# Patient Record
Sex: FEMALE | Race: ASIAN | NOT HISPANIC OR LATINO | URBAN - METROPOLITAN AREA
[De-identification: names, ages, dates, MRNs, and addresses within clinical notes are randomized per-mention and may not be internally consistent; named-entity substitution may affect disease eponyms.]

---

## 2024-01-21 ENCOUNTER — INPATIENT (INPATIENT)
Facility: HOSPITAL | Age: 69
LOS: 3 days | Discharge: HOME | End: 2024-01-25
Attending: HOSPITALIST | Admitting: HOSPITALIST
Payer: MEDICAID

## 2024-01-21 VITALS
OXYGEN SATURATION: 99 % | SYSTOLIC BLOOD PRESSURE: 106 MMHG | TEMPERATURE: 98 F | HEART RATE: 95 BPM | DIASTOLIC BLOOD PRESSURE: 70 MMHG | RESPIRATION RATE: 16 BRPM

## 2024-01-21 DIAGNOSIS — E11.9 TYPE 2 DIABETES MELLITUS WITHOUT COMPLICATIONS: ICD-10-CM

## 2024-01-21 DIAGNOSIS — N17.9 ACUTE KIDNEY FAILURE, UNSPECIFIED: ICD-10-CM

## 2024-01-21 DIAGNOSIS — Z29.9 ENCOUNTER FOR PROPHYLACTIC MEASURES, UNSPECIFIED: ICD-10-CM

## 2024-01-21 DIAGNOSIS — E87.5 HYPERKALEMIA: ICD-10-CM

## 2024-01-21 DIAGNOSIS — I10 ESSENTIAL (PRIMARY) HYPERTENSION: ICD-10-CM

## 2024-01-21 DIAGNOSIS — Z79.899 OTHER LONG TERM (CURRENT) DRUG THERAPY: ICD-10-CM

## 2024-01-21 DIAGNOSIS — I25.10 ATHEROSCLEROTIC HEART DISEASE OF NATIVE CORONARY ARTERY WITHOUT ANGINA PECTORIS: ICD-10-CM

## 2024-01-21 LAB
A1C WITH ESTIMATED AVERAGE GLUCOSE RESULT: 8.8 % — HIGH (ref 4–5.6)
ALBUMIN SERPL ELPH-MCNC: 4.3 G/DL — SIGNIFICANT CHANGE UP (ref 3.3–5)
ALP SERPL-CCNC: 145 U/L — HIGH (ref 40–120)
ALT FLD-CCNC: 5 U/L — SIGNIFICANT CHANGE UP (ref 4–33)
ANION GAP SERPL CALC-SCNC: 16 MMOL/L — HIGH (ref 7–14)
ANION GAP SERPL CALC-SCNC: 18 MMOL/L — HIGH (ref 7–14)
ANISOCYTOSIS BLD QL: SLIGHT — SIGNIFICANT CHANGE UP
APPEARANCE UR: CLEAR — SIGNIFICANT CHANGE UP
APTT BLD: 32.4 SEC — SIGNIFICANT CHANGE UP (ref 24.5–35.6)
AST SERPL-CCNC: 11 U/L — SIGNIFICANT CHANGE UP (ref 4–32)
B-OH-BUTYR SERPL-SCNC: <0 MMOL/L — SIGNIFICANT CHANGE UP (ref 0–0.4)
BACTERIA # UR AUTO: ABNORMAL /HPF
BASE EXCESS BLDV CALC-SCNC: -3 MMOL/L — LOW (ref -2–3)
BASOPHILS # BLD AUTO: 0.23 K/UL — HIGH (ref 0–0.2)
BASOPHILS NFR BLD AUTO: 1.9 % — SIGNIFICANT CHANGE UP (ref 0–2)
BILIRUB SERPL-MCNC: 0.2 MG/DL — SIGNIFICANT CHANGE UP (ref 0.2–1.2)
BILIRUB UR-MCNC: NEGATIVE — SIGNIFICANT CHANGE UP
BLOOD GAS VENOUS COMPREHENSIVE RESULT: SIGNIFICANT CHANGE UP
BUN SERPL-MCNC: 59 MG/DL — HIGH (ref 7–23)
BUN SERPL-MCNC: 65 MG/DL — HIGH (ref 7–23)
CALCIUM SERPL-MCNC: 10 MG/DL — SIGNIFICANT CHANGE UP (ref 8.4–10.5)
CALCIUM SERPL-MCNC: 9.7 MG/DL — SIGNIFICANT CHANGE UP (ref 8.4–10.5)
CHLORIDE BLDV-SCNC: 96 MMOL/L — SIGNIFICANT CHANGE UP (ref 96–108)
CHLORIDE SERPL-SCNC: 91 MMOL/L — LOW (ref 98–107)
CHLORIDE SERPL-SCNC: 96 MMOL/L — LOW (ref 98–107)
CO2 BLDV-SCNC: 25.5 MMOL/L — SIGNIFICANT CHANGE UP (ref 22–26)
CO2 SERPL-SCNC: 18 MMOL/L — LOW (ref 22–31)
CO2 SERPL-SCNC: 20 MMOL/L — LOW (ref 22–31)
COD CRY URNS QL: PRESENT
COLOR SPEC: YELLOW — SIGNIFICANT CHANGE UP
COMMENT - URINE: SIGNIFICANT CHANGE UP
CREAT ?TM UR-MCNC: 21 MG/DL — SIGNIFICANT CHANGE UP
CREAT SERPL-MCNC: 1.92 MG/DL — HIGH (ref 0.5–1.3)
CREAT SERPL-MCNC: 2.22 MG/DL — HIGH (ref 0.5–1.3)
DIFF PNL FLD: NEGATIVE — SIGNIFICANT CHANGE UP
EGFR: 23 ML/MIN/1.73M2 — LOW
EGFR: 28 ML/MIN/1.73M2 — LOW
EOSINOPHIL # BLD AUTO: 0.23 K/UL — SIGNIFICANT CHANGE UP (ref 0–0.5)
EOSINOPHIL NFR BLD AUTO: 1.9 % — SIGNIFICANT CHANGE UP (ref 0–6)
EPI CELLS # UR: PRESENT
ESTIMATED AVERAGE GLUCOSE: 206 — SIGNIFICANT CHANGE UP
FLUAV AG NPH QL: SIGNIFICANT CHANGE UP
FLUBV AG NPH QL: SIGNIFICANT CHANGE UP
GAS PNL BLDV: 124 MMOL/L — LOW (ref 136–145)
GAS PNL BLDV: SIGNIFICANT CHANGE UP
GIANT PLATELETS BLD QL SMEAR: PRESENT — SIGNIFICANT CHANGE UP
GLUCOSE BLDC GLUCOMTR-MCNC: 184 MG/DL — HIGH (ref 70–99)
GLUCOSE BLDC GLUCOMTR-MCNC: 284 MG/DL — HIGH (ref 70–99)
GLUCOSE BLDC GLUCOMTR-MCNC: 364 MG/DL — HIGH (ref 70–99)
GLUCOSE BLDV-MCNC: 216 MG/DL — HIGH (ref 70–99)
GLUCOSE SERPL-MCNC: 150 MG/DL — HIGH (ref 70–99)
GLUCOSE SERPL-MCNC: 218 MG/DL — HIGH (ref 70–99)
GLUCOSE UR QL: 250 MG/DL
HCO3 BLDV-SCNC: 24 MMOL/L — SIGNIFICANT CHANGE UP (ref 22–29)
HCT VFR BLD CALC: 36.5 % — SIGNIFICANT CHANGE UP (ref 34.5–45)
HCT VFR BLDA CALC: 38 % — SIGNIFICANT CHANGE UP (ref 34.5–46.5)
HGB BLD CALC-MCNC: 12.8 G/DL — SIGNIFICANT CHANGE UP (ref 11.7–16.1)
HGB BLD-MCNC: 12.4 G/DL — SIGNIFICANT CHANGE UP (ref 11.5–15.5)
HYALINE CASTS # UR AUTO: SIGNIFICANT CHANGE UP
IANC: 8.1 K/UL — HIGH (ref 1.8–7.4)
INR BLD: 0.93 RATIO — SIGNIFICANT CHANGE UP (ref 0.85–1.18)
KETONES UR-MCNC: NEGATIVE MG/DL — SIGNIFICANT CHANGE UP
LACTATE BLDV-MCNC: 1.4 MMOL/L — SIGNIFICANT CHANGE UP (ref 0.5–2)
LEUKOCYTE ESTERASE UR-ACNC: ABNORMAL
LIDOCAIN IGE QN: 79 U/L — HIGH (ref 7–60)
LYMPHOCYTES # BLD AUTO: 17.5 % — SIGNIFICANT CHANGE UP (ref 13–44)
LYMPHOCYTES # BLD AUTO: 2.09 K/UL — SIGNIFICANT CHANGE UP (ref 1–3.3)
MAGNESIUM SERPL-MCNC: 1.8 MG/DL — SIGNIFICANT CHANGE UP (ref 1.6–2.6)
MCHC RBC-ENTMCNC: 29.1 PG — SIGNIFICANT CHANGE UP (ref 27–34)
MCHC RBC-ENTMCNC: 34 GM/DL — SIGNIFICANT CHANGE UP (ref 32–36)
MCV RBC AUTO: 85.7 FL — SIGNIFICANT CHANGE UP (ref 80–100)
MONOCYTES # BLD AUTO: 0.35 K/UL — SIGNIFICANT CHANGE UP (ref 0–0.9)
MONOCYTES NFR BLD AUTO: 2.9 % — SIGNIFICANT CHANGE UP (ref 2–14)
NEUTROPHILS # BLD AUTO: 8.45 K/UL — HIGH (ref 1.8–7.4)
NEUTROPHILS NFR BLD AUTO: 70.9 % — SIGNIFICANT CHANGE UP (ref 43–77)
NITRITE UR-MCNC: NEGATIVE — SIGNIFICANT CHANGE UP
OSMOLALITY UR: 294 MOSM/KG — SIGNIFICANT CHANGE UP (ref 50–1200)
PCO2 BLDV: 50 MMHG — SIGNIFICANT CHANGE UP (ref 39–52)
PH BLDV: 7.29 — LOW (ref 7.32–7.43)
PH UR: 6.5 — SIGNIFICANT CHANGE UP (ref 5–8)
PLAT MORPH BLD: NORMAL — SIGNIFICANT CHANGE UP
PLATELET # BLD AUTO: 423 K/UL — HIGH (ref 150–400)
PLATELET COUNT - ESTIMATE: NORMAL — SIGNIFICANT CHANGE UP
PO2 BLDV: 41 MMHG — SIGNIFICANT CHANGE UP (ref 25–45)
POIKILOCYTOSIS BLD QL AUTO: SLIGHT — SIGNIFICANT CHANGE UP
POTASSIUM BLDV-SCNC: 5.7 MMOL/L — HIGH (ref 3.5–5.1)
POTASSIUM SERPL-MCNC: 6 MMOL/L — HIGH (ref 3.5–5.3)
POTASSIUM SERPL-MCNC: 6.3 MMOL/L — CRITICAL HIGH (ref 3.5–5.3)
POTASSIUM SERPL-SCNC: 6 MMOL/L — HIGH (ref 3.5–5.3)
POTASSIUM SERPL-SCNC: 6.3 MMOL/L — CRITICAL HIGH (ref 3.5–5.3)
POTASSIUM UR-SCNC: 12.7 MMOL/L — SIGNIFICANT CHANGE UP
PROT ?TM UR-MCNC: 8 MG/DL — SIGNIFICANT CHANGE UP
PROT SERPL-MCNC: 8.7 G/DL — HIGH (ref 6–8.3)
PROT UR-MCNC: NEGATIVE MG/DL — SIGNIFICANT CHANGE UP
PROT/CREAT UR-RTO: 0.4 RATIO — HIGH (ref 0–0.2)
PROTHROM AB SERPL-ACNC: 10.5 SEC — SIGNIFICANT CHANGE UP (ref 9.5–13)
RBC # BLD: 4.26 M/UL — SIGNIFICANT CHANGE UP (ref 3.8–5.2)
RBC # FLD: 12.7 % — SIGNIFICANT CHANGE UP (ref 10.3–14.5)
RBC BLD AUTO: NORMAL — SIGNIFICANT CHANGE UP
RBC CASTS # UR COMP ASSIST: 1 /HPF — SIGNIFICANT CHANGE UP (ref 0–4)
RSV RNA NPH QL NAA+NON-PROBE: SIGNIFICANT CHANGE UP
SAO2 % BLDV: 61.8 % — LOW (ref 67–88)
SARS-COV-2 RNA SPEC QL NAA+PROBE: SIGNIFICANT CHANGE UP
SMUDGE CELLS # BLD: PRESENT — SIGNIFICANT CHANGE UP
SODIUM SERPL-SCNC: 129 MMOL/L — LOW (ref 135–145)
SODIUM SERPL-SCNC: 130 MMOL/L — LOW (ref 135–145)
SODIUM UR-SCNC: 101 MMOL/L — SIGNIFICANT CHANGE UP
SP GR SPEC: 1.01 — SIGNIFICANT CHANGE UP (ref 1–1.03)
TROPONIN T, HIGH SENSITIVITY RESULT: 41 NG/L — SIGNIFICANT CHANGE UP
TROPONIN T, HIGH SENSITIVITY RESULT: 49 NG/L — SIGNIFICANT CHANGE UP
TSH SERPL-MCNC: 4.57 UIU/ML — HIGH (ref 0.27–4.2)
UROBILINOGEN FLD QL: 0.2 MG/DL — SIGNIFICANT CHANGE UP (ref 0.2–1)
UUN UR-MCNC: 123 MG/DL — SIGNIFICANT CHANGE UP
VARIANT LYMPHS # BLD: 4.9 % — SIGNIFICANT CHANGE UP (ref 0–6)
WBC # BLD: 11.92 K/UL — HIGH (ref 3.8–10.5)
WBC # FLD AUTO: 11.92 K/UL — HIGH (ref 3.8–10.5)
WBC UR QL: 2 /HPF — SIGNIFICANT CHANGE UP (ref 0–5)

## 2024-01-21 PROCEDURE — 99223 1ST HOSP IP/OBS HIGH 75: CPT

## 2024-01-21 PROCEDURE — 71046 X-RAY EXAM CHEST 2 VIEWS: CPT | Mod: 26

## 2024-01-21 PROCEDURE — 99285 EMERGENCY DEPT VISIT HI MDM: CPT

## 2024-01-21 RX ORDER — DEXTROSE 50 % IN WATER 50 %
50 SYRINGE (ML) INTRAVENOUS ONCE
Refills: 0 | Status: COMPLETED | OUTPATIENT
Start: 2024-01-21 | End: 2024-01-21

## 2024-01-21 RX ORDER — SODIUM ZIRCONIUM CYCLOSILICATE 10 G/10G
10 POWDER, FOR SUSPENSION ORAL ONCE
Refills: 0 | Status: COMPLETED | OUTPATIENT
Start: 2024-01-21 | End: 2024-01-21

## 2024-01-21 RX ORDER — INSULIN HUMAN 100 [IU]/ML
5 INJECTION, SOLUTION SUBCUTANEOUS ONCE
Refills: 0 | Status: COMPLETED | OUTPATIENT
Start: 2024-01-21 | End: 2024-01-21

## 2024-01-21 RX ORDER — ASPIRIN/CALCIUM CARB/MAGNESIUM 324 MG
81 TABLET ORAL DAILY
Refills: 0 | Status: DISCONTINUED | OUTPATIENT
Start: 2024-01-21 | End: 2024-01-25

## 2024-01-21 RX ORDER — GABAPENTIN 400 MG/1
300 CAPSULE ORAL ONCE
Refills: 0 | Status: COMPLETED | OUTPATIENT
Start: 2024-01-21 | End: 2024-01-21

## 2024-01-21 RX ORDER — CALCIUM GLUCONATE 100 MG/ML
2 VIAL (ML) INTRAVENOUS ONCE
Refills: 0 | Status: COMPLETED | OUTPATIENT
Start: 2024-01-21 | End: 2024-01-21

## 2024-01-21 RX ORDER — INSULIN LISPRO 100/ML
VIAL (ML) SUBCUTANEOUS
Refills: 0 | Status: DISCONTINUED | OUTPATIENT
Start: 2024-01-21 | End: 2024-01-25

## 2024-01-21 RX ORDER — HEPARIN SODIUM 5000 [USP'U]/ML
5000 INJECTION INTRAVENOUS; SUBCUTANEOUS EVERY 8 HOURS
Refills: 0 | Status: DISCONTINUED | OUTPATIENT
Start: 2024-01-21 | End: 2024-01-25

## 2024-01-21 RX ORDER — DEXTROSE 50 % IN WATER 50 %
25 SYRINGE (ML) INTRAVENOUS ONCE
Refills: 0 | Status: DISCONTINUED | OUTPATIENT
Start: 2024-01-21 | End: 2024-01-25

## 2024-01-21 RX ORDER — DEXTROSE 50 % IN WATER 50 %
15 SYRINGE (ML) INTRAVENOUS ONCE
Refills: 0 | Status: DISCONTINUED | OUTPATIENT
Start: 2024-01-21 | End: 2024-01-25

## 2024-01-21 RX ORDER — SODIUM CHLORIDE 9 MG/ML
1000 INJECTION INTRAMUSCULAR; INTRAVENOUS; SUBCUTANEOUS ONCE
Refills: 0 | Status: COMPLETED | OUTPATIENT
Start: 2024-01-21 | End: 2024-01-21

## 2024-01-21 RX ORDER — GLUCAGON INJECTION, SOLUTION 0.5 MG/.1ML
1 INJECTION, SOLUTION SUBCUTANEOUS ONCE
Refills: 0 | Status: DISCONTINUED | OUTPATIENT
Start: 2024-01-21 | End: 2024-01-25

## 2024-01-21 RX ORDER — SODIUM CHLORIDE 9 MG/ML
1000 INJECTION, SOLUTION INTRAVENOUS
Refills: 0 | Status: DISCONTINUED | OUTPATIENT
Start: 2024-01-21 | End: 2024-01-25

## 2024-01-21 RX ORDER — INSULIN LISPRO 100/ML
VIAL (ML) SUBCUTANEOUS AT BEDTIME
Refills: 0 | Status: DISCONTINUED | OUTPATIENT
Start: 2024-01-21 | End: 2024-01-25

## 2024-01-21 RX ORDER — CEFTRIAXONE 500 MG/1
1000 INJECTION, POWDER, FOR SOLUTION INTRAMUSCULAR; INTRAVENOUS ONCE
Refills: 0 | Status: COMPLETED | OUTPATIENT
Start: 2024-01-21 | End: 2024-01-21

## 2024-01-21 RX ORDER — ACETAMINOPHEN 500 MG
650 TABLET ORAL EVERY 6 HOURS
Refills: 0 | Status: DISCONTINUED | OUTPATIENT
Start: 2024-01-21 | End: 2024-01-25

## 2024-01-21 RX ORDER — SODIUM CHLORIDE 9 MG/ML
1000 INJECTION INTRAMUSCULAR; INTRAVENOUS; SUBCUTANEOUS
Refills: 0 | Status: DISCONTINUED | OUTPATIENT
Start: 2024-01-21 | End: 2024-01-24

## 2024-01-21 RX ADMIN — GABAPENTIN 300 MILLIGRAM(S): 400 CAPSULE ORAL at 13:45

## 2024-01-21 RX ADMIN — SODIUM ZIRCONIUM CYCLOSILICATE 10 GRAM(S): 10 POWDER, FOR SUSPENSION ORAL at 21:39

## 2024-01-21 RX ADMIN — INSULIN HUMAN 5 UNIT(S): 100 INJECTION, SOLUTION SUBCUTANEOUS at 15:26

## 2024-01-21 RX ADMIN — SODIUM ZIRCONIUM CYCLOSILICATE 10 GRAM(S): 10 POWDER, FOR SUSPENSION ORAL at 15:34

## 2024-01-21 RX ADMIN — SODIUM CHLORIDE 1000 MILLILITER(S): 9 INJECTION INTRAMUSCULAR; INTRAVENOUS; SUBCUTANEOUS at 13:45

## 2024-01-21 RX ADMIN — CEFTRIAXONE 100 MILLIGRAM(S): 500 INJECTION, POWDER, FOR SOLUTION INTRAMUSCULAR; INTRAVENOUS at 20:45

## 2024-01-21 RX ADMIN — Medication 200 GRAM(S): at 15:29

## 2024-01-21 RX ADMIN — SODIUM CHLORIDE 100 MILLILITER(S): 9 INJECTION INTRAMUSCULAR; INTRAVENOUS; SUBCUTANEOUS at 22:30

## 2024-01-21 RX ADMIN — INSULIN HUMAN 5 UNIT(S): 100 INJECTION, SOLUTION SUBCUTANEOUS at 21:38

## 2024-01-21 RX ADMIN — Medication 50 MILLILITER(S): at 21:39

## 2024-01-21 RX ADMIN — Medication 3: at 22:29

## 2024-01-21 RX ADMIN — Medication 50 MILLILITER(S): at 15:24

## 2024-01-21 NOTE — H&P ADULT - NSHPREVIEWOFSYSTEMS_GEN_ALL_CORE
REVIEW OF SYSTEMS:    CONSTITUTIONAL: +generalized weakness, No fevers or chills  EYES/ENT: No visual changes;  No vertigo or throat pain   NECK: No pain or stiffness  RESPIRATORY: No cough, wheezing, hemoptysis; No shortness of breath  CARDIOVASCULAR: No chest pain or palpitations  GASTROINTESTINAL: No abdominal or epigastric pain. No nausea, vomiting, or hematemesis; No diarrhea or constipation. No melena or hematochezia.  GENITOURINARY: No dysuria, frequency or hematuria  NEUROLOGICAL: No numbness or weakness  SKIN: No itching, rashes

## 2024-01-21 NOTE — ED PROVIDER NOTE - PROGRESS NOTE DETAILS
Kvng LOPES: Pt found to have hyperkalemic, have MICHELLE, and hyperglycemia not in DKA. Pt received bolus of fluids, insulin/dextrose, calcium gluconate, lokelma. Will admit for the above. EKG stable. Kvng LOPES: Accepted by hospitalist Mere for admission. Hospital team will f/u repeat potassium. Pt's repeat BP has improved since first documented BP. Pt has dysuria, will treat for UTI.

## 2024-01-21 NOTE — ED PROVIDER NOTE - ATTENDING CONTRIBUTION TO CARE
Patient is a 68-year-old female with a history of type 2 diabetes mellitus, hypertension, hyperlipidemia, CAD s/p 3 stents, asthma brought in by daughter for worsening weakness.  I Patient is a 68-year-old female with a history of type 2 diabetes mellitus, hypertension, hyperlipidemia, CAD s/p 3 stents, asthma brought in by daughter for worsening weakness.  Patient's daughter is providing the majority of the history.  I spoke to patient's daughter via ,      68-year-old female, history of type 2 diabetes, coronary artery disease status post 3 stents, hypertension, hyperlipidemia, asthma, presenting with a chief complaint of chronic weakness, ongoing for 2 months.  Review of systems otherwise negative for acute symptomology such as new fevers, chills, shortness of breath, chest pain, abdominal pain, dysuria, changes in bowel habits.  Still tolerating p.o.  Brought in because she moved to [place] from [facility] 3 months ago and has not been able to obtain medication.  She does not have insurance.  She does not have a primary doctor.  No allergies to medications.  She is insulin-dependent, has been off of this secondary to financial difficulties. Patient is a 68-year-old female, Portuguese speaking, with a history of type 2 diabetes mellitus, hypertension, hyperlipidemia, CAD s/p 3 stents, asthma brought in by daughter for worsening weakness.  Patient's daughter is providing the majority of the history.  I spoke to patient's daughter via , Portuguese , Debra Boston, [id number].  accordingly, patient just arrived from [facility] 3 months ago and has not been able to get her medications.  Patient has been weak for 2 months but recently has been worse in the past few days.  Patient is so weak that she cannot get up on her own.  She has decreased p.o. intake as well.  Patient denies any chest pain or shortness of breath.  Per daughter, patient ran out of her insulin as well.  Patient denies any vomiting.  No abdominal pain.  No urinary symptoms.      VS noted  Gen: Weak, elderly  HEENT: EOMI, mmm  Lungs: CTAB/L no C/ W /R   CVS: RRR   Abd; Soft non tender, non distended   Ext: no edema  Skin: no rash  Neuro AAOx3 non focal clear speech  a/p:  weakness–patient has been significantly weak 2 days.  Concern for metabolic derangement.  Patient also ran out of pain medication for diabetes.  Concern for metabolic derangement, hyperglycemia.  Will check labs,  EKG, chest x-ray.  - Ira LOPES

## 2024-01-21 NOTE — H&P ADULT - PROBLEM SELECTOR PLAN 5
Pt states 3 stents placed 10 months ago. Pt unsure if on anti-platelets.  - will start asa 81 mg QD
74
Preventive measure
Anxiety
Hyperlipidemia

## 2024-01-21 NOTE — H&P ADULT - PROBLEM SELECTOR PLAN 3
Pt states she takes insulin, unclear how much. Glucose mildly elevated.  - start low ISS and adjust as needed  - f/u a1c

## 2024-01-21 NOTE — H&P ADULT - PROBLEM SELECTOR PLAN 6
Attempted to call family multiple times however goes straight to voicemail  - med rec pharmacist emailed

## 2024-01-21 NOTE — ED ADULT NURSE NOTE - NSFALLRISKINTERV_ED_ALL_ED
Communicate fall risk and risk factors to all staff, patient, and family/Provide visual cue: yellow wristband, yellow gown, etc/Reinforce activity limits and safety measures with patient and family/Call bell, personal items and telephone in reach/Instruct patient to call for assistance before getting out of bed/chair/stretcher/Non-slip footwear applied when patient is off stretcher/[place] to call system/Physically safe environment - no spills, clutter or unnecessary equipment/Purposeful Proactive Rounding/Room/bathroom lighting operational, light cord in reach Assistance OOB with selected safe patient handling equipment if applicable/Assistance with ambulation/Communicate fall risk and risk factors to all staff, patient, and family/Monitor gait and stability/Provide patient with walking aids/Provide visual cue: yellow wristband, yellow gown, etc/Reinforce activity limits and safety measures with patient and family/Call bell, personal items and telephone in reach/Instruct patient to call for assistance before getting out of bed/chair/stretcher/Non-slip footwear applied when patient is off stretcher/[place] to call system/Physically safe environment - no spills, clutter or unnecessary equipment/Purposeful Proactive Rounding/Room/bathroom lighting operational, light cord in reach

## 2024-01-21 NOTE — ED ADULT TRIAGE NOTE - CHIEF COMPLAINT QUOTE
pt Malay speaking, daughter & son in law translating, b/t ED by family, poor historians  for months of weakness, difficulty walking, fatigue on insulin for dm and ran out today fs= [id number]

## 2024-01-21 NOTE — H&P ADULT - NSHPPHYSICALEXAM_GEN_ALL_CORE
VITAL SIGNS:  T(C): 36.4 (01-21-24 @ 16:51), Max: 36.7 (01-21-24 @ 12:18)  T(F): 97.5 (01-21-24 @ 16:51), Max: 98.1 (01-21-24 @ 12:18)  HR: 88 (01-21-24 @ 16:51) (88 - 95)  BP: 99/65 (01-21-24 @ 16:51) (99/65 - 106/70)  BP(mean): --  RR: 16 (01-21-24 @ 16:51) (16 - 16)  SpO2: 98% (01-21-24 @ 16:51) (98% - 99%)  Wt(kg): --    PHYSICAL EXAM:    Constitutional: resting comfortably in bed; NAD  Head: NC/AT  Eyes: PERRL, EOMI, anicteric sclera  ENT: no nasal discharge; uvula midline, no oropharyngeal erythema or exudates; MMM  Neck: supple  Respiratory: CTA B/L; no W/R/R, no retractions  Cardiac: +S1/S2; RRR; no M/R/G  Gastrointestinal: abdomen soft, NT/ND; no rebound or guarding; +BSx4  Back: spine midline, no bony tenderness  Extremities: WWP, no clubbing or cyanosis; no peripheral edema  Musculoskeletal: NROM x4; no joint swelling, tenderness or erythema  Vascular: distal pulses intact  Dermatologic: skin warm, dry and intact; no rashes  Lymphatic: no submandibular or cervical LAD  Neurologic: AAOx3; moves all 4 extremities  Psychiatric: affect and characteristics of appearance, verbalizations, behaviors are appropriate VITAL SIGNS:  T(C): 36.4 (01-21-24 @ 16:51), Max: 36.7 (01-21-24 @ 12:18)  T(F): 97.5 (01-21-24 @ 16:51), Max: 98.1 (01-21-24 @ 12:18)  HR: 88 (01-21-24 @ 16:51) (88 - 95)  BP: 99/65 (01-21-24 @ 16:51) (99/65 - 106/70)  BP(mean): --  RR: 16 (01-21-24 @ 16:51) (16 - 16)  SpO2: 98% (01-21-24 @ 16:51) (98% - 99%)  Wt(kg): --    PHYSICAL EXAM:    Constitutional: resting comfortably in bed; NAD  Head: NC/AT  Eyes: PERRL, EOMI, anicteric sclera  ENT: no nasal discharge; uvula midline, no oropharyngeal erythema or exudates; MMM  Neck: supple  Respiratory: CTA B/L; no W/R/R, no retractions  Cardiac: +S1/S2; RRR; no M/R/G  Gastrointestinal: abdomen soft, NT/ND; no rebound or guarding; +BSx4  Extremities: WWP, no clubbing or cyanosis; no peripheral edema  Musculoskeletal: NROM x4; no joint swelling, tenderness or erythema  Vascular: distal pulses intact  Dermatologic: skin warm, dry and intact; no rashes  Lymphatic: no submandibular or cervical LAD  Neurologic: AAOx3; moves all 4 extremities against gravity; hard of hearing - hearing aid in right ear.   Psychiatric: calm

## 2024-01-21 NOTE — ED PROVIDER NOTE - NS ED ROS FT
GENERAL: + generalized weakness, no fever  EYES: no eye pain  HEENT: no neck pain  CARDIAC: no chest pain  PULMONARY: no SOB  GI: no abdominal pain  : no dysuria  SKIN: no rashes  NEURO: no headache  MSK: no new joint pain

## 2024-01-21 NOTE — H&P ADULT - ASSESSMENT
Pt is a 68-year-old female, history of type 2 diabetes, coronary artery disease status post 3 stents (10 months ago per pt), hypertension, hyperlipidemia, asthma, presenting with a chief complaint of chronic generalized weakness and  feeling unwell for the past 2 months. Pt found to have elevated Cr, suspect MICHELLE and hyperkalemia. Pt admitted for further management.

## 2024-01-21 NOTE — ED PROVIDER NOTE - CLINICAL SUMMARY MEDICAL DECISION MAKING FREE TEXT BOX
Adult female, with history of coronary artery disease, hypertension, hyperlipidemia, type 2 diabetes, presenting with a chief complaint of chronic weakness.  Vital signs are unremarkable.  Physical exam nonfocal.  EKG without STEMI equivalents.  Rule out acute cardiac injury, troponin.  Assess for evidence of occult infection, urinalysis, urine culture, chest x-ray for pneumonia.  No hypoxia suspicion for bacteremia or meningitis or major soft tissue infection.  Rule out metabolic derangements, DKA.  Rule out hematologic derangements.  Treat for dehydration given clinical evidence of same.  Reporting chronic foot pain reminiscent of diabetic neuropathy, treat with gabapentin.  Close reassessment.  Disposition a function of workup and clinical course.

## 2024-01-21 NOTE — ED ADULT NURSE NOTE - OBJECTIVE STATEMENT
Pt arrives to spot 8a. Pt is A and Ox4 and ambulatory. Hx: DM type 2.  with MD at bedside. Pt Pt arrives to spot 8a. Pt is A and Ox4 and ambulatory. Hx: DM type 2.  with MD at bedside. Pt arrives to the ED complaining of increase in weakness over the past few weeks. Per daughter her mother has feeling falling more frequently and needs additional assistance around the house. Pt also ran out of her nightly diabetic medication and is requesting a refill. Pt denies chest pain, shortness of breath, headaches, n/v. Airway is patent ,respirations are even and unlabored. Pt denies GI/ complaints at this time. Skin is clean, dry and intact. IV placed, pt medicated and labs sent per provider orders. Plan of care ongoing, safety maintained.

## 2024-01-21 NOTE — H&P ADULT - NSHPLABSRESULTS_GEN_ALL_CORE
.  LABS:                         12.4   11.92 )-----------( 423      ( 21 Jan 2024 14:01 )             36.5     01-21    x   |  x   |  59<H>  ----------------------------<  150<H>  x    |  18<L>  |  1.92<H>    Ca    10.0      21 Jan 2024 19:00  Mg     1.80     01-21    TPro  8.7<H>  /  Alb  4.3  /  TBili  0.2  /  DBili  x   /  AST  11  /  ALT  5   /  AlkPhos  145<H>  01-21    PT/INR - ( 21 Jan 2024 14:01 )   PT: 10.5 sec;   INR: 0.93 ratio         PTT - ( 21 Jan 2024 14:01 )  PTT:32.4 sec  Urinalysis Basic - ( 21 Jan 2024 19:00 )    Color: x / Appearance: x / SG: x / pH: x  Gluc: 150 mg/dL / Ketone: x  / Bili: x / Urobili: x   Blood: x / Protein: x / Nitrite: x   Leuk Esterase: x / RBC: x / WBC x   Sq Epi: x / Non Sq Epi: x / Bacteria: x                RADIOLOGY, EKG & ADDITIONAL TESTS: Reviewed.

## 2024-01-21 NOTE — H&P ADULT - HISTORY OF PRESENT ILLNESS
Pt is a 68-year-old female, history of type 2 diabetes, coronary artery disease status post 3 stents, hypertension, hyperlipidemia, asthma, presenting with a chief complaint of chronic weakness, ongoing for 2 months.    In ED, vitals T 98.1, HR 95, /76, RR 16, O2 sat 99% on RA  Labs significant for: wbc 11.92, Na 129, K 6.0, bicarb 20, BUN/Cr 65/2.22, glucose 218, TSH 4.57  EKG personally reviewed:  CXR: IMPRESSION:  Clear lungs.  ED management: IV calcium gluconate, gabapentin 300 mg x1, 1 L NS, regular insulin 5 units, d50, lokelma 10 mg  Pt is a 68-year-old female, history of type 2 diabetes, coronary artery disease status post 3 stents (10 months ago per pt), hypertension, hyperlipidemia, asthma, presenting with a chief complaint of chronic generalized weakness and  feeling unwell for the past 2 months. Attempted to call daughterDinora for collateral at listed number in chart however number goes straight to voice mail. Pt also complains of pain in b/l legs which she states is related to her DM. Unclear if she has been taking her medications at home, per chart review, pt has been off medications since coming from [facility] 3 months ago. She denies any fever, chills, chest pain, SOB, abd pain, n/v/d, or any urinary symptoms. She states she has been having poor appetite as well.     In ED, vitals T 98.1, HR 95, /76, RR 16, O2 sat 99% on RA  Labs significant for: wbc 11.92, Na 129, K 6.0, bicarb 20, BUN/Cr 65/2.22, glucose 218, TSH 4.57  EKG personally reviewed:   CXR: IMPRESSION:  Clear lungs.  ED management: IV calcium gluconate, gabapentin 300 mg x1, 1 L NS, regular insulin 5 units, d50, lokelma 10 mg

## 2024-01-21 NOTE — H&P ADULT - PROBLEM SELECTOR PLAN 2
K initially 6.0, s/p lokelma 10 mg, regular insulin 5 units w/ repeat 6.3 (mildly hemolyzed).   - repeat lokelam 10 mg x1, and regular insulin 5 units x1 w/ dextrose  - f/u repeat K K initially 6.0, s/p lokelma 10 mg, regular insulin 5 units w/ repeat 6.3 (mildly hemolyzed). EKG w/o peaked T waves.  - repeat lokelam 10 mg x1, and regular insulin 5 units x1 w/ dextrose  - f/u repeat K  - monitor on tele

## 2024-01-21 NOTE — ED ADULT NURSE NOTE - CHIEF COMPLAINT QUOTE
pt Spanish speaking, daughter & son in law translating, b/t ED by family, poor historians  for months of weakness, difficulty walking, fatigue on insulin for dm and ran out today fs= 269

## 2024-01-21 NOTE — ED PROVIDER NOTE - PHYSICAL EXAMINATION
Gen: NAD, non-toxic appearing  Head: normal appearing  HEENT: normal conjunctiva, dry MM  Lung: no respiratory distress, speaking in full sentences, ctab      CV: regular rate and rhythm, no murmurs  Abd: soft, non distended, non tender   MSK: no visible deformities  Neuro: alert and grossly oriented, no gross motor deficits  Skin: No emmie rashes

## 2024-01-21 NOTE — ED PROVIDER NOTE - OBJECTIVE STATEMENT
Exam done using the Language Line [facility] , [id number] [place]    68-year-old female, history of type 2 diabetes, coronary artery disease status post 3 stents, hypertension, hyperlipidemia, asthma, presenting with a chief complaint of chronic weakness, ongoing for 2 months.  Review of systems otherwise negative for acute symptomology such as new fevers, chills, shortness of breath, chest pain, abdominal pain, dysuria, changes in bowel habits.  Still tolerating p.o.  Brought in because she moved to [place] from [facility] 3 months ago and has not been able to obtain medication.  She does not have insurance.  She does not have a primary doctor.  No allergies to medications.  She is insulin-dependent, has been off of this secondary to financial difficulties.

## 2024-01-21 NOTE — ED PROVIDER NOTE - OTHER FINDINGS
Normal sinus rhythm.  Intervals are unremarkable.  P wave morphology is unremarkable.  There is poor R wave progression.   T wave inversions in 1 and aVL.  No STEMI equivalents.

## 2024-01-21 NOTE — H&P ADULT - PROBLEM SELECTOR PLAN 1
Elevated Cr to 2.22 --> 1.91 after IVF. No previous baseline. FeNA consistent with post-renal however w/o difficulty urinating. Possibly pre-renal as well pt also appears dry on exam.   - bladder scan, check renal U/S  - trial IVF  - monitor Cr  - avoid nephrotoxic agents

## 2024-01-22 LAB
A1C WITH ESTIMATED AVERAGE GLUCOSE RESULT: 8.7 % — HIGH (ref 4–5.6)
ANION GAP SERPL CALC-SCNC: 15 MMOL/L — HIGH (ref 7–14)
ANION GAP SERPL CALC-SCNC: 16 MMOL/L — HIGH (ref 7–14)
BASOPHILS # BLD AUTO: 0.11 K/UL — SIGNIFICANT CHANGE UP (ref 0–0.2)
BASOPHILS NFR BLD AUTO: 1 % — SIGNIFICANT CHANGE UP (ref 0–2)
BUN SERPL-MCNC: 52 MG/DL — HIGH (ref 7–23)
BUN SERPL-MCNC: 58 MG/DL — HIGH (ref 7–23)
CALCIUM SERPL-MCNC: 9.2 MG/DL — SIGNIFICANT CHANGE UP (ref 8.4–10.5)
CALCIUM SERPL-MCNC: 9.6 MG/DL — SIGNIFICANT CHANGE UP (ref 8.4–10.5)
CHLORIDE SERPL-SCNC: 95 MMOL/L — LOW (ref 98–107)
CHLORIDE SERPL-SCNC: 97 MMOL/L — LOW (ref 98–107)
CO2 SERPL-SCNC: 18 MMOL/L — LOW (ref 22–31)
CO2 SERPL-SCNC: 18 MMOL/L — LOW (ref 22–31)
CREAT SERPL-MCNC: 1.65 MG/DL — HIGH (ref 0.5–1.3)
CREAT SERPL-MCNC: 1.83 MG/DL — HIGH (ref 0.5–1.3)
CULTURE RESULTS: SIGNIFICANT CHANGE UP
EGFR: 29 ML/MIN/1.73M2 — LOW
EGFR: 33 ML/MIN/1.73M2 — LOW
EOSINOPHIL # BLD AUTO: 0.27 K/UL — SIGNIFICANT CHANGE UP (ref 0–0.5)
EOSINOPHIL NFR BLD AUTO: 2.3 % — SIGNIFICANT CHANGE UP (ref 0–6)
ESTIMATED AVERAGE GLUCOSE: 203 — SIGNIFICANT CHANGE UP
GLUCOSE BLDC GLUCOMTR-MCNC: 155 MG/DL — HIGH (ref 70–99)
GLUCOSE BLDC GLUCOMTR-MCNC: 156 MG/DL — HIGH (ref 70–99)
GLUCOSE BLDC GLUCOMTR-MCNC: 180 MG/DL — HIGH (ref 70–99)
GLUCOSE BLDC GLUCOMTR-MCNC: 185 MG/DL — HIGH (ref 70–99)
GLUCOSE BLDC GLUCOMTR-MCNC: 204 MG/DL — HIGH (ref 70–99)
GLUCOSE BLDC GLUCOMTR-MCNC: 234 MG/DL — HIGH (ref 70–99)
GLUCOSE BLDC GLUCOMTR-MCNC: 234 MG/DL — HIGH (ref 70–99)
GLUCOSE BLDC GLUCOMTR-MCNC: 277 MG/DL — HIGH (ref 70–99)
GLUCOSE SERPL-MCNC: 189 MG/DL — HIGH (ref 70–99)
GLUCOSE SERPL-MCNC: 249 MG/DL — HIGH (ref 70–99)
HCT VFR BLD CALC: 35.3 % — SIGNIFICANT CHANGE UP (ref 34.5–45)
HCV AB S/CO SERPL IA: 0.09 S/CO — SIGNIFICANT CHANGE UP (ref 0–0.99)
HCV AB SERPL-IMP: SIGNIFICANT CHANGE UP
HGB BLD-MCNC: 11.5 G/DL — SIGNIFICANT CHANGE UP (ref 11.5–15.5)
IANC: 6.91 K/UL — SIGNIFICANT CHANGE UP (ref 1.8–7.4)
IMM GRANULOCYTES NFR BLD AUTO: 2.8 % — HIGH (ref 0–0.9)
LYMPHOCYTES # BLD AUTO: 2.77 K/UL — SIGNIFICANT CHANGE UP (ref 1–3.3)
LYMPHOCYTES # BLD AUTO: 24.1 % — SIGNIFICANT CHANGE UP (ref 13–44)
MAGNESIUM SERPL-MCNC: 1.5 MG/DL — LOW (ref 1.6–2.6)
MAGNESIUM SERPL-MCNC: 1.5 MG/DL — LOW (ref 1.6–2.6)
MCHC RBC-ENTMCNC: 28.8 PG — SIGNIFICANT CHANGE UP (ref 27–34)
MCHC RBC-ENTMCNC: 32.6 GM/DL — SIGNIFICANT CHANGE UP (ref 32–36)
MCV RBC AUTO: 88.5 FL — SIGNIFICANT CHANGE UP (ref 80–100)
MONOCYTES # BLD AUTO: 1.11 K/UL — HIGH (ref 0–0.9)
MONOCYTES NFR BLD AUTO: 9.7 % — SIGNIFICANT CHANGE UP (ref 2–14)
NEUTROPHILS # BLD AUTO: 6.91 K/UL — SIGNIFICANT CHANGE UP (ref 1.8–7.4)
NEUTROPHILS NFR BLD AUTO: 60.1 % — SIGNIFICANT CHANGE UP (ref 43–77)
NRBC # BLD: 0 /100 WBCS — SIGNIFICANT CHANGE UP (ref 0–0)
NRBC # FLD: 0 K/UL — SIGNIFICANT CHANGE UP (ref 0–0)
PHOSPHATE SERPL-MCNC: 3.7 MG/DL — SIGNIFICANT CHANGE UP (ref 2.5–4.5)
PHOSPHATE SERPL-MCNC: 4.6 MG/DL — HIGH (ref 2.5–4.5)
PLATELET # BLD AUTO: 365 K/UL — SIGNIFICANT CHANGE UP (ref 150–400)
POTASSIUM SERPL-MCNC: 5.1 MMOL/L — SIGNIFICANT CHANGE UP (ref 3.5–5.3)
POTASSIUM SERPL-MCNC: 6 MMOL/L — HIGH (ref 3.5–5.3)
POTASSIUM SERPL-SCNC: 5.1 MMOL/L — SIGNIFICANT CHANGE UP (ref 3.5–5.3)
POTASSIUM SERPL-SCNC: 6 MMOL/L — HIGH (ref 3.5–5.3)
RBC # BLD: 3.99 M/UL — SIGNIFICANT CHANGE UP (ref 3.8–5.2)
RBC # FLD: 12.7 % — SIGNIFICANT CHANGE UP (ref 10.3–14.5)
SODIUM SERPL-SCNC: 128 MMOL/L — LOW (ref 135–145)
SODIUM SERPL-SCNC: 131 MMOL/L — LOW (ref 135–145)
SPECIMEN SOURCE: SIGNIFICANT CHANGE UP
WBC # BLD: 11.49 K/UL — HIGH (ref 3.8–10.5)
WBC # FLD AUTO: 11.49 K/UL — HIGH (ref 3.8–10.5)

## 2024-01-22 PROCEDURE — 76770 US EXAM ABDO BACK WALL COMP: CPT | Mod: 26

## 2024-01-22 PROCEDURE — 99232 SBSQ HOSP IP/OBS MODERATE 35: CPT

## 2024-01-22 RX ORDER — DEXTROSE 50 % IN WATER 50 %
50 SYRINGE (ML) INTRAVENOUS ONCE
Refills: 0 | Status: COMPLETED | OUTPATIENT
Start: 2024-01-22 | End: 2024-01-22

## 2024-01-22 RX ORDER — CEFTRIAXONE 500 MG/1
1000 INJECTION, POWDER, FOR SOLUTION INTRAMUSCULAR; INTRAVENOUS EVERY 24 HOURS
Refills: 0 | Status: DISCONTINUED | OUTPATIENT
Start: 2024-01-22 | End: 2024-01-24

## 2024-01-22 RX ORDER — SODIUM ZIRCONIUM CYCLOSILICATE 10 G/10G
10 POWDER, FOR SUSPENSION ORAL ONCE
Refills: 0 | Status: DISCONTINUED | OUTPATIENT
Start: 2024-01-22 | End: 2024-01-22

## 2024-01-22 RX ORDER — MAGNESIUM SULFATE 500 MG/ML
2 VIAL (ML) INJECTION ONCE
Refills: 0 | Status: COMPLETED | OUTPATIENT
Start: 2024-01-22 | End: 2024-01-22

## 2024-01-22 RX ORDER — INSULIN HUMAN 100 [IU]/ML
5 INJECTION, SOLUTION SUBCUTANEOUS ONCE
Refills: 0 | Status: COMPLETED | OUTPATIENT
Start: 2024-01-22 | End: 2024-01-22

## 2024-01-22 RX ORDER — ALBUTEROL 90 UG/1
10 AEROSOL, METERED ORAL ONCE
Refills: 0 | Status: COMPLETED | OUTPATIENT
Start: 2024-01-22 | End: 2024-01-22

## 2024-01-22 RX ORDER — FUROSEMIDE 40 MG
20 TABLET ORAL ONCE
Refills: 0 | Status: COMPLETED | OUTPATIENT
Start: 2024-01-22 | End: 2024-01-22

## 2024-01-22 RX ORDER — CALCIUM GLUCONATE 100 MG/ML
1 VIAL (ML) INTRAVENOUS ONCE
Refills: 0 | Status: COMPLETED | OUTPATIENT
Start: 2024-01-22 | End: 2024-01-22

## 2024-01-22 RX ADMIN — Medication 20 MILLIGRAM(S): at 04:40

## 2024-01-22 RX ADMIN — SODIUM CHLORIDE 100 MILLILITER(S): 9 INJECTION INTRAMUSCULAR; INTRAVENOUS; SUBCUTANEOUS at 06:33

## 2024-01-22 RX ADMIN — Medication 3: at 09:38

## 2024-01-22 RX ADMIN — SODIUM CHLORIDE 100 MILLILITER(S): 9 INJECTION INTRAMUSCULAR; INTRAVENOUS; SUBCUTANEOUS at 19:24

## 2024-01-22 RX ADMIN — SODIUM ZIRCONIUM CYCLOSILICATE 10 GRAM(S): 10 POWDER, FOR SUSPENSION ORAL at 01:24

## 2024-01-22 RX ADMIN — Medication 2: at 15:10

## 2024-01-22 RX ADMIN — ALBUTEROL 10 MILLIGRAM(S): 90 AEROSOL, METERED ORAL at 03:10

## 2024-01-22 RX ADMIN — Medication 25 GRAM(S): at 07:52

## 2024-01-22 RX ADMIN — CEFTRIAXONE 100 MILLIGRAM(S): 500 INJECTION, POWDER, FOR SOLUTION INTRAMUSCULAR; INTRAVENOUS at 21:17

## 2024-01-22 RX ADMIN — HEPARIN SODIUM 5000 UNIT(S): 5000 INJECTION INTRAVENOUS; SUBCUTANEOUS at 15:11

## 2024-01-22 RX ADMIN — Medication 50 MILLILITER(S): at 01:24

## 2024-01-22 RX ADMIN — Medication 1: at 17:35

## 2024-01-22 RX ADMIN — Medication 81 MILLIGRAM(S): at 15:10

## 2024-01-22 RX ADMIN — HEPARIN SODIUM 5000 UNIT(S): 5000 INJECTION INTRAVENOUS; SUBCUTANEOUS at 06:33

## 2024-01-22 RX ADMIN — INSULIN HUMAN 5 UNIT(S): 100 INJECTION, SOLUTION SUBCUTANEOUS at 01:18

## 2024-01-22 RX ADMIN — Medication 100 GRAM(S): at 01:24

## 2024-01-22 RX ADMIN — HEPARIN SODIUM 5000 UNIT(S): 5000 INJECTION INTRAVENOUS; SUBCUTANEOUS at 21:18

## 2024-01-22 NOTE — CHART NOTE - NSCHARTNOTEFT_GEN_A_CORE
US kidney bladder showed 668cc of urine in bladder and mild hydro of L kidney. ACP arrived at bedside to assess patient. Assessment limited 2/2 patient Greek speaking and hard of hearing. Attempted phone  ID [phone] but per  patient was unable to hear her no matter how loud she spoke. Google translate used so patient could read questions and respond. Patient denies dysuria and says that she is able to urinate and wants to urinate. Per RN, patient had gone to the bathroom herself. RN asked to assist patient to bathroom and collect urine so measured amount can be deducted from US measurement. Exam limited due to lack of availability of bladder scan and ER RN unable to perform bladder scans. If patient demonstrates inability to urinate despite urge when RN takes her to the bathroom, will have espinoza placed.     Lexy Argueta PA-C  Department of Internal Medicine  pager [phone], available on [employer] TEAMS

## 2024-01-22 NOTE — ED ADULT NURSE REASSESSMENT NOTE - NS ED NURSE REASSESS COMMENT FT1
Called for critical K of 6.3. Admitting team at bedside made aware. MD at bedside stated to hold off on repeat metabolic panel.
Pt resting in spot 8a comfortably at this time. Pt medicated per MAR. Pt has irregular HR on the cardiac monitor. Plan of care ongoing, safety maintained
Pt resting in spot 8a comfortably. Pt is having irregular HR on the cardiac monitor. MD made aware. Airway is patent, respirations are even and unlabored. Plan of care ongoing, safety maintained.
Received report on pt, sleeping comfortably in bed, respirations are even and unlabored, vitals as charted. Awaiting bed assignment

## 2024-01-22 NOTE — PROVIDER CONTACT NOTE (OTHER) - ASSESSMENT
Pt has no signs of distress. Attempted to use  ([id number]). When pointing to patient chest for pain, pt says "no."

## 2024-01-22 NOTE — PROVIDER CONTACT NOTE (OTHER) - RECOMMENDATIONS
As per ACP Flaco Johnson, no interventions at this time, possible artifact, if patient HR goes as high as 120s obtain EKG.

## 2024-01-22 NOTE — PROVIDER CONTACT NOTE (OTHER) - SITUATION
AS per tele tech pt  but did not sustain. pt is currently sinus tachy (105 bpm). Pt is Yakut speaking has a right side hearing aide not working. Unable to use . No signs of distress.

## 2024-01-22 NOTE — CHART NOTE - NSCHARTNOTEFT_GEN_A_CORE
Rpt BMP revealed K still elevated at 6.0 (mildly hemolyzed) but downtrending from 6.3.   Discussed w/ Dr. Ruiz. HyperK cocktail reordered (regular insulin 5u & D5W) w/ albuterol & IVP Lasix 20mg. RN made aware to administer meds.  Will follow up AM BMP to monitor K.

## 2024-01-22 NOTE — PHARMACOTHERAPY INTERVENTION NOTE - COMMENTS
Medication history is incomplete. Unable to verify patient's medication list with two sources. Discrepancies noted in provider handoff. Please call spectra [phone] if you have any questions.     Patient's family notes that they will be visiting later this evening (1/22/24) and will bring a list of medications.

## 2024-01-22 NOTE — PROGRESS NOTE ADULT - PROBLEM SELECTOR PLAN 1
Elevated Cr to 2.22 --> 1.91 after IVF. No previous baseline. FeNA consistent with post-renal however w/o difficulty urinating.  however , US and bladder scan show retention, place Moran , start Flomax   - monitor Cr  - avoid nephrotoxic agents

## 2024-01-23 LAB
ANION GAP SERPL CALC-SCNC: 11 MMOL/L — SIGNIFICANT CHANGE UP (ref 7–14)
ANION GAP SERPL CALC-SCNC: 12 MMOL/L — SIGNIFICANT CHANGE UP (ref 7–14)
BUN SERPL-MCNC: 35 MG/DL — HIGH (ref 7–23)
BUN SERPL-MCNC: 40 MG/DL — HIGH (ref 7–23)
CALCIUM SERPL-MCNC: 9.1 MG/DL — SIGNIFICANT CHANGE UP (ref 8.4–10.5)
CALCIUM SERPL-MCNC: 9.1 MG/DL — SIGNIFICANT CHANGE UP (ref 8.4–10.5)
CHLORIDE SERPL-SCNC: 103 MMOL/L — SIGNIFICANT CHANGE UP (ref 98–107)
CHLORIDE SERPL-SCNC: 104 MMOL/L — SIGNIFICANT CHANGE UP (ref 98–107)
CO2 SERPL-SCNC: 19 MMOL/L — LOW (ref 22–31)
CO2 SERPL-SCNC: 20 MMOL/L — LOW (ref 22–31)
CREAT SERPL-MCNC: 1.18 MG/DL — SIGNIFICANT CHANGE UP (ref 0.5–1.3)
CREAT SERPL-MCNC: 1.26 MG/DL — SIGNIFICANT CHANGE UP (ref 0.5–1.3)
EGFR: 46 ML/MIN/1.73M2 — LOW
EGFR: 50 ML/MIN/1.73M2 — LOW
GLUCOSE BLDC GLUCOMTR-MCNC: 144 MG/DL — HIGH (ref 70–99)
GLUCOSE BLDC GLUCOMTR-MCNC: 186 MG/DL — HIGH (ref 70–99)
GLUCOSE BLDC GLUCOMTR-MCNC: 202 MG/DL — HIGH (ref 70–99)
GLUCOSE BLDC GLUCOMTR-MCNC: 204 MG/DL — HIGH (ref 70–99)
GLUCOSE BLDC GLUCOMTR-MCNC: 245 MG/DL — HIGH (ref 70–99)
GLUCOSE SERPL-MCNC: 207 MG/DL — HIGH (ref 70–99)
GLUCOSE SERPL-MCNC: 60 MG/DL — LOW (ref 70–99)
HCT VFR BLD CALC: 34 % — LOW (ref 34.5–45)
HGB BLD-MCNC: 11.2 G/DL — LOW (ref 11.5–15.5)
MAGNESIUM SERPL-MCNC: 2.1 MG/DL — SIGNIFICANT CHANGE UP (ref 1.6–2.6)
MCHC RBC-ENTMCNC: 28.8 PG — SIGNIFICANT CHANGE UP (ref 27–34)
MCHC RBC-ENTMCNC: 32.9 GM/DL — SIGNIFICANT CHANGE UP (ref 32–36)
MCV RBC AUTO: 87.4 FL — SIGNIFICANT CHANGE UP (ref 80–100)
NRBC # BLD: 0 /100 WBCS — SIGNIFICANT CHANGE UP (ref 0–0)
NRBC # FLD: 0 K/UL — SIGNIFICANT CHANGE UP (ref 0–0)
PHOSPHATE SERPL-MCNC: 2.6 MG/DL — SIGNIFICANT CHANGE UP (ref 2.5–4.5)
PLATELET # BLD AUTO: 397 K/UL — SIGNIFICANT CHANGE UP (ref 150–400)
POTASSIUM SERPL-MCNC: 4.8 MMOL/L — SIGNIFICANT CHANGE UP (ref 3.5–5.3)
POTASSIUM SERPL-MCNC: 6.3 MMOL/L — CRITICAL HIGH (ref 3.5–5.3)
POTASSIUM SERPL-SCNC: 4.8 MMOL/L — SIGNIFICANT CHANGE UP (ref 3.5–5.3)
POTASSIUM SERPL-SCNC: 6.3 MMOL/L — CRITICAL HIGH (ref 3.5–5.3)
RBC # BLD: 3.89 M/UL — SIGNIFICANT CHANGE UP (ref 3.8–5.2)
RBC # FLD: 12.6 % — SIGNIFICANT CHANGE UP (ref 10.3–14.5)
SODIUM SERPL-SCNC: 133 MMOL/L — LOW (ref 135–145)
SODIUM SERPL-SCNC: 136 MMOL/L — SIGNIFICANT CHANGE UP (ref 135–145)
WBC # BLD: 11.43 K/UL — HIGH (ref 3.8–10.5)
WBC # FLD AUTO: 11.43 K/UL — HIGH (ref 3.8–10.5)

## 2024-01-23 PROCEDURE — 99232 SBSQ HOSP IP/OBS MODERATE 35: CPT

## 2024-01-23 PROCEDURE — 93010 ELECTROCARDIOGRAM REPORT: CPT

## 2024-01-23 RX ORDER — INSULIN HUMAN 100 [IU]/ML
5 INJECTION, SOLUTION SUBCUTANEOUS ONCE
Refills: 0 | Status: COMPLETED | OUTPATIENT
Start: 2024-01-23 | End: 2024-01-23

## 2024-01-23 RX ORDER — TAMSULOSIN HYDROCHLORIDE 0.4 MG/1
0.4 CAPSULE ORAL AT BEDTIME
Refills: 0 | Status: DISCONTINUED | OUTPATIENT
Start: 2024-01-23 | End: 2024-01-25

## 2024-01-23 RX ORDER — SODIUM ZIRCONIUM CYCLOSILICATE 10 G/10G
10 POWDER, FOR SUSPENSION ORAL ONCE
Refills: 0 | Status: COMPLETED | OUTPATIENT
Start: 2024-01-23 | End: 2024-01-23

## 2024-01-23 RX ORDER — SODIUM ZIRCONIUM CYCLOSILICATE 10 G/10G
10 POWDER, FOR SUSPENSION ORAL ONCE
Refills: 0 | Status: DISCONTINUED | OUTPATIENT
Start: 2024-01-23 | End: 2024-01-23

## 2024-01-23 RX ORDER — DEXTROSE 50 % IN WATER 50 %
50 SYRINGE (ML) INTRAVENOUS ONCE
Refills: 0 | Status: COMPLETED | OUTPATIENT
Start: 2024-01-23 | End: 2024-01-23

## 2024-01-23 RX ORDER — DEXTROSE 50 % IN WATER 50 %
50 SYRINGE (ML) INTRAVENOUS ONCE
Refills: 0 | Status: DISCONTINUED | OUTPATIENT
Start: 2024-01-23 | End: 2024-01-23

## 2024-01-23 RX ORDER — CALCIUM GLUCONATE 100 MG/ML
1 VIAL (ML) INTRAVENOUS ONCE
Refills: 0 | Status: COMPLETED | OUTPATIENT
Start: 2024-01-23 | End: 2024-01-23

## 2024-01-23 RX ORDER — INSULIN LISPRO 100/ML
10 VIAL (ML) SUBCUTANEOUS ONCE
Refills: 0 | Status: DISCONTINUED | OUTPATIENT
Start: 2024-01-23 | End: 2024-01-23

## 2024-01-23 RX ADMIN — CEFTRIAXONE 100 MILLIGRAM(S): 500 INJECTION, POWDER, FOR SOLUTION INTRAMUSCULAR; INTRAVENOUS at 22:56

## 2024-01-23 RX ADMIN — HEPARIN SODIUM 5000 UNIT(S): 5000 INJECTION INTRAVENOUS; SUBCUTANEOUS at 05:12

## 2024-01-23 RX ADMIN — Medication 50 MILLILITER(S): at 08:08

## 2024-01-23 RX ADMIN — SODIUM CHLORIDE 100 MILLILITER(S): 9 INJECTION INTRAMUSCULAR; INTRAVENOUS; SUBCUTANEOUS at 14:58

## 2024-01-23 RX ADMIN — Medication 100 GRAM(S): at 14:57

## 2024-01-23 RX ADMIN — HEPARIN SODIUM 5000 UNIT(S): 5000 INJECTION INTRAVENOUS; SUBCUTANEOUS at 13:23

## 2024-01-23 RX ADMIN — Medication 81 MILLIGRAM(S): at 13:23

## 2024-01-23 RX ADMIN — SODIUM ZIRCONIUM CYCLOSILICATE 10 GRAM(S): 10 POWDER, FOR SUSPENSION ORAL at 08:09

## 2024-01-23 RX ADMIN — INSULIN HUMAN 5 UNIT(S): 100 INJECTION, SOLUTION SUBCUTANEOUS at 08:08

## 2024-01-23 RX ADMIN — Medication 2: at 09:18

## 2024-01-23 RX ADMIN — SODIUM CHLORIDE 100 MILLILITER(S): 9 INJECTION INTRAMUSCULAR; INTRAVENOUS; SUBCUTANEOUS at 05:16

## 2024-01-23 RX ADMIN — Medication 2: at 18:16

## 2024-01-23 NOTE — PHYSICAL THERAPY INITIAL EVALUATION ADULT - MANUAL MUSCLE TESTING RESULTS, REHAB EVAL
patient with difficulty following commands. bilateral upper extremities and bilateral lower extremities grossly 3/5./grossly assessed due to

## 2024-01-23 NOTE — PHYSICAL THERAPY INITIAL EVALUATION ADULT - ADDITIONAL COMMENTS
Patient is poor historian and hard of hearing. Patient reports she lives with family, no steps to manage, and ambulates independently. Patient came from [facility] 2 months ago.     Patient left semi-supine in no apparent distress, +lines/tubes intact, on ED stretcher.

## 2024-01-23 NOTE — PHYSICAL THERAPY INITIAL EVALUATION ADULT - FOLLOWS COMMANDS/ANSWERS QUESTIONS, REHAB EVAL
Patient hard of hearing and not understanding ./75% of the time/able to follow single-step instructions

## 2024-01-23 NOTE — PHYSICAL THERAPY INITIAL EVALUATION ADULT - PERTINENT HX OF CURRENT PROBLEM, REHAB EVAL
Pt is a 68-year-old female, history of type 2 diabetes, coronary artery disease status post 3 stents, hypertension, hyperlipidemia, asthma, presenting with a chief complaint of chronic generalized weakness and feeling unwell for the past 2 months. Suspect MICHELLE and hyperkalemia.

## 2024-01-23 NOTE — PHYSICAL THERAPY INITIAL EVALUATION ADULT - GENERAL OBSERVATIONS, REHAB EVAL
Patient received semi-supine in no apparent distress, +IV, +telemetry, +espinoza, agreeable to participate.  BPM.

## 2024-01-23 NOTE — PHYSICAL THERAPY INITIAL EVALUATION ADULT - NSPTDISCHREC_GEN_A_CORE
However, patient to remain on PT program here at [facility] to promote safe mobility and prevent deconditioning./No skilled PT needs

## 2024-01-23 NOTE — PATIENT PROFILE ADULT - NSPROREFERSVCHOMEDIABETES_GEN_A_NUR
no Detail Level: Zone General Sunscreen Counseling: Sunscreen (SPF 30+ or higher) and photoprotection advised

## 2024-01-23 NOTE — PROGRESS NOTE ADULT - PROBLEM SELECTOR PLAN 1
Elevated Cr to 2.22 --> 1.91 after IVF. No previous baseline. FeNA consistent with post-renal however w/o difficulty urinating.  however , US and bladder scan show retention, place Moran , start Flomax   - monitor Cr improved to 1.26   - avoid nephrotoxic agents

## 2024-01-24 LAB
ANION GAP SERPL CALC-SCNC: 11 MMOL/L — SIGNIFICANT CHANGE UP (ref 7–14)
BUN SERPL-MCNC: 22 MG/DL — SIGNIFICANT CHANGE UP (ref 7–23)
CALCIUM SERPL-MCNC: 8.8 MG/DL — SIGNIFICANT CHANGE UP (ref 8.4–10.5)
CHLORIDE SERPL-SCNC: 106 MMOL/L — SIGNIFICANT CHANGE UP (ref 98–107)
CO2 SERPL-SCNC: 20 MMOL/L — LOW (ref 22–31)
CREAT SERPL-MCNC: 1.08 MG/DL — SIGNIFICANT CHANGE UP (ref 0.5–1.3)
EGFR: 55 ML/MIN/1.73M2 — LOW
GLUCOSE BLDC GLUCOMTR-MCNC: 207 MG/DL — HIGH (ref 70–99)
GLUCOSE BLDC GLUCOMTR-MCNC: 220 MG/DL — HIGH (ref 70–99)
GLUCOSE SERPL-MCNC: 222 MG/DL — HIGH (ref 70–99)
HCT VFR BLD CALC: 28 % — LOW (ref 34.5–45)
HGB BLD-MCNC: 9.1 G/DL — LOW (ref 11.5–15.5)
MAGNESIUM SERPL-MCNC: 1.2 MG/DL — LOW (ref 1.6–2.6)
MCHC RBC-ENTMCNC: 29.1 PG — SIGNIFICANT CHANGE UP (ref 27–34)
MCHC RBC-ENTMCNC: 32.5 GM/DL — SIGNIFICANT CHANGE UP (ref 32–36)
MCV RBC AUTO: 89.5 FL — SIGNIFICANT CHANGE UP (ref 80–100)
NRBC # BLD: 0 /100 WBCS — SIGNIFICANT CHANGE UP (ref 0–0)
NRBC # FLD: 0 K/UL — SIGNIFICANT CHANGE UP (ref 0–0)
PHOSPHATE SERPL-MCNC: 2.7 MG/DL — SIGNIFICANT CHANGE UP (ref 2.5–4.5)
PLATELET # BLD AUTO: 335 K/UL — SIGNIFICANT CHANGE UP (ref 150–400)
POTASSIUM SERPL-MCNC: 5.1 MMOL/L — SIGNIFICANT CHANGE UP (ref 3.5–5.3)
POTASSIUM SERPL-SCNC: 5.1 MMOL/L — SIGNIFICANT CHANGE UP (ref 3.5–5.3)
RBC # BLD: 3.13 M/UL — LOW (ref 3.8–5.2)
RBC # FLD: 13 % — SIGNIFICANT CHANGE UP (ref 10.3–14.5)
SODIUM SERPL-SCNC: 137 MMOL/L — SIGNIFICANT CHANGE UP (ref 135–145)
WBC # BLD: 9.41 K/UL — SIGNIFICANT CHANGE UP (ref 3.8–10.5)
WBC # FLD AUTO: 9.41 K/UL — SIGNIFICANT CHANGE UP (ref 3.8–10.5)

## 2024-01-24 PROCEDURE — 99232 SBSQ HOSP IP/OBS MODERATE 35: CPT

## 2024-01-24 PROCEDURE — 74176 CT ABD & PELVIS W/O CONTRAST: CPT | Mod: 26

## 2024-01-24 RX ORDER — ATORVASTATIN CALCIUM 80 MG/1
40 TABLET, FILM COATED ORAL AT BEDTIME
Refills: 0 | Status: DISCONTINUED | OUTPATIENT
Start: 2024-01-24 | End: 2024-01-25

## 2024-01-24 RX ORDER — ONDANSETRON 8 MG/1
4 TABLET, FILM COATED ORAL ONCE
Refills: 0 | Status: COMPLETED | OUTPATIENT
Start: 2024-01-24 | End: 2024-01-24

## 2024-01-24 RX ORDER — SODIUM ZIRCONIUM CYCLOSILICATE 10 G/10G
5 POWDER, FOR SUSPENSION ORAL ONCE
Refills: 0 | Status: COMPLETED | OUTPATIENT
Start: 2024-01-24 | End: 2024-01-24

## 2024-01-24 RX ORDER — FINASTERIDE 5 MG/1
5 TABLET, FILM COATED ORAL DAILY
Refills: 0 | Status: DISCONTINUED | OUTPATIENT
Start: 2024-01-24 | End: 2024-01-24

## 2024-01-24 RX ORDER — MAGNESIUM SULFATE 500 MG/ML
2 VIAL (ML) INJECTION ONCE
Refills: 0 | Status: COMPLETED | OUTPATIENT
Start: 2024-01-24 | End: 2024-01-24

## 2024-01-24 RX ORDER — SENNA PLUS 8.6 MG/1
2 TABLET ORAL AT BEDTIME
Refills: 0 | Status: DISCONTINUED | OUTPATIENT
Start: 2024-01-24 | End: 2024-01-25

## 2024-01-24 RX ORDER — LANOLIN ALCOHOL/MO/W.PET/CERES
9 CREAM (GRAM) TOPICAL ONCE
Refills: 0 | Status: COMPLETED | OUTPATIENT
Start: 2024-01-24 | End: 2024-01-24

## 2024-01-24 RX ORDER — POLYETHYLENE GLYCOL 3350 17 G/17G
17 POWDER, FOR SOLUTION ORAL DAILY
Refills: 0 | Status: DISCONTINUED | OUTPATIENT
Start: 2024-01-24 | End: 2024-01-25

## 2024-01-24 RX ADMIN — POLYETHYLENE GLYCOL 3350 17 GRAM(S): 17 POWDER, FOR SOLUTION ORAL at 13:05

## 2024-01-24 RX ADMIN — Medication 9 MILLIGRAM(S): at 02:27

## 2024-01-24 RX ADMIN — SENNA PLUS 2 TABLET(S): 8.6 TABLET ORAL at 22:40

## 2024-01-24 RX ADMIN — HEPARIN SODIUM 5000 UNIT(S): 5000 INJECTION INTRAVENOUS; SUBCUTANEOUS at 13:05

## 2024-01-24 RX ADMIN — Medication 2: at 09:10

## 2024-01-24 RX ADMIN — Medication 25 GRAM(S): at 11:22

## 2024-01-24 RX ADMIN — Medication 81 MILLIGRAM(S): at 11:24

## 2024-01-24 RX ADMIN — SODIUM ZIRCONIUM CYCLOSILICATE 5 GRAM(S): 10 POWDER, FOR SUSPENSION ORAL at 09:48

## 2024-01-24 RX ADMIN — Medication 1: at 13:05

## 2024-01-24 RX ADMIN — HEPARIN SODIUM 5000 UNIT(S): 5000 INJECTION INTRAVENOUS; SUBCUTANEOUS at 06:12

## 2024-01-24 RX ADMIN — TAMSULOSIN HYDROCHLORIDE 0.4 MILLIGRAM(S): 0.4 CAPSULE ORAL at 22:40

## 2024-01-24 RX ADMIN — ATORVASTATIN CALCIUM 40 MILLIGRAM(S): 80 TABLET, FILM COATED ORAL at 22:40

## 2024-01-24 RX ADMIN — FINASTERIDE 5 MILLIGRAM(S): 5 TABLET, FILM COATED ORAL at 11:24

## 2024-01-24 RX ADMIN — Medication 1: at 17:50

## 2024-01-24 RX ADMIN — HEPARIN SODIUM 5000 UNIT(S): 5000 INJECTION INTRAVENOUS; SUBCUTANEOUS at 22:42

## 2024-01-24 RX ADMIN — ONDANSETRON 4 MILLIGRAM(S): 8 TABLET, FILM COATED ORAL at 04:55

## 2024-01-24 NOTE — CHART NOTE - NSCHARTNOTEFT_GEN_A_CORE
ACP [id number] OVERNIGHT COVERAGE    Notified by RN that arrived to floor from ED upset and crying, refusing to get into hospital bed from stretcher.  Pt seen at bedside, pt speaks Hungarian, dialect [place].  Pt on phone w/ family members, explained to family that we will use [employer]  to assist with communication.  Utilized  Paty, ID#: [id number].  Pt also [place], has R hearing aid.  Pt able to state her name.  Explained to pt that she is in the hospital, and is now in a hospital bed upstairs to continue her care, pt requesting to go back to ED, but is not stating her reasons.  Explained that she cannot be sent back to the ED, pt requesting to go home during the day time.  Pt also requesting to use restroom to have BM.  Pt does not appear to have insight into present circumstances as she was unable to tell writer why she was in the hospital when prompted using the ., verbal redirection recommended for now.  1:1 ordered for elopement risk.  Endorsed to covering floor provider.    Tico Gamino PA-C  Department of Hospital Medicine - Night ACP  Spectra [phone]

## 2024-01-24 NOTE — PROGRESS NOTE ADULT - PROBLEM SELECTOR PLAN 1
Elevated Cr to 2.22 --> 1.91 after IVF. No previous baseline. FeNA consistent with post-renal however w/o difficulty urinating.  however , US and bladder scan show retention, place Espinoza , start Flomax   - arabella resolved, cr 1.08,   -dc IVF  - renal US: mild L hydro, renal cysts, distended bladder  - bowel regimen  -c/w flomax  -maintain espinoza today, dc espinoza and TOV tomorrow night  - avoid nephrotoxic agents Elevated Cr to 2.22 --> 1.91 after IVF. No previous baseline. FeNA consistent with post-renal however w/o difficulty urinating.  however , US and bladder scan show retention, place Espinoza , start Flomax   - arabella resolved, cr 1.08,   -dc IVF  - renal US: mild L hydro, renal cysts, distended bladder  check CT Abd/pelvis w/o contrast  - bowel regimen  -c/w flomax  -maintain espinoza today, dc espinoza and TOV tomorrow night  - avoid nephrotoxic agents Elevated Cr to 2.22 --> 1.91 after IVF. No previous baseline. FeNA consistent with post-renal however w/o difficulty urinating.  however , US and bladder scan show retention, place Espinoza , start Flomax   - arabella resolved, cr 1.08,   -dc IVF  - renal US: mild L hydro, renal cysts, distended bladder  check CT Abd/pelvis w/o contrast  - Ucx normal gu lucita, got 2 doses of CTX, dc abx  - bowel regimen  -c/w flomax  -maintain espinoza today, dc espinoza and TOV tomorrow  - avoid nephrotoxic agents  - Dispo: PT recs no skilled needs, home when medically optimized Elevated Cr to 2.22 --> 1.91 after IVF. No previous baseline. FeNA consistent with post-renal however w/o difficulty urinating.  however , US and bladder scan show retention, place Espinoza , start Flomax   - arabella resolved, cr 1.08,   -dc IVF  - renal US: mild L hydro, renal cysts, distended bladder  check CT Abd/pelvis w/o contrast  - Ucx normal gu lucita, got 3 doses of CTX, dc abx  - bowel regimen  -c/w flomax  -dc espinoza and TOV tonight , serial bladder scan after espinoza removal and straight cath if PVR>300cc  - avoid nephrotoxic agents  - Dispo: PT recs no skilled needs, home when medically optimized

## 2024-01-25 VITALS
SYSTOLIC BLOOD PRESSURE: 136 MMHG | HEART RATE: 100 BPM | RESPIRATION RATE: 18 BRPM | TEMPERATURE: 99 F | OXYGEN SATURATION: 100 % | DIASTOLIC BLOOD PRESSURE: 74 MMHG

## 2024-01-25 LAB
ANION GAP SERPL CALC-SCNC: 12 MMOL/L — SIGNIFICANT CHANGE UP (ref 7–14)
BUN SERPL-MCNC: 16 MG/DL — SIGNIFICANT CHANGE UP (ref 7–23)
CALCIUM SERPL-MCNC: 9 MG/DL — SIGNIFICANT CHANGE UP (ref 8.4–10.5)
CHLORIDE SERPL-SCNC: 104 MMOL/L — SIGNIFICANT CHANGE UP (ref 98–107)
CHOLEST SERPL-MCNC: 199 MG/DL — SIGNIFICANT CHANGE UP
CO2 SERPL-SCNC: 19 MMOL/L — LOW (ref 22–31)
CREAT SERPL-MCNC: 1.06 MG/DL — SIGNIFICANT CHANGE UP (ref 0.5–1.3)
EGFR: 57 ML/MIN/1.73M2 — LOW
GLUCOSE SERPL-MCNC: 190 MG/DL — HIGH (ref 70–99)
HCT VFR BLD CALC: 28.9 % — LOW (ref 34.5–45)
HDLC SERPL-MCNC: 36 MG/DL — LOW
HGB BLD-MCNC: 9.4 G/DL — LOW (ref 11.5–15.5)
LIPID PNL WITH DIRECT LDL SERPL: 99 MG/DL — SIGNIFICANT CHANGE UP
MAGNESIUM SERPL-MCNC: 2.1 MG/DL — SIGNIFICANT CHANGE UP (ref 1.6–2.6)
MCHC RBC-ENTMCNC: 29.4 PG — SIGNIFICANT CHANGE UP (ref 27–34)
MCHC RBC-ENTMCNC: 32.5 GM/DL — SIGNIFICANT CHANGE UP (ref 32–36)
MCV RBC AUTO: 90.3 FL — SIGNIFICANT CHANGE UP (ref 80–100)
NON HDL CHOLESTEROL: 163 MG/DL — HIGH
NRBC # BLD: 0 /100 WBCS — SIGNIFICANT CHANGE UP (ref 0–0)
NRBC # FLD: 0 K/UL — SIGNIFICANT CHANGE UP (ref 0–0)
PHOSPHATE SERPL-MCNC: 3.2 MG/DL — SIGNIFICANT CHANGE UP (ref 2.5–4.5)
PLATELET # BLD AUTO: 306 K/UL — SIGNIFICANT CHANGE UP (ref 150–400)
POTASSIUM SERPL-MCNC: 5 MMOL/L — SIGNIFICANT CHANGE UP (ref 3.5–5.3)
POTASSIUM SERPL-SCNC: 5 MMOL/L — SIGNIFICANT CHANGE UP (ref 3.5–5.3)
RBC # BLD: 3.2 M/UL — LOW (ref 3.8–5.2)
RBC # FLD: 13 % — SIGNIFICANT CHANGE UP (ref 10.3–14.5)
SODIUM SERPL-SCNC: 135 MMOL/L — SIGNIFICANT CHANGE UP (ref 135–145)
TRIGL SERPL-MCNC: 322 MG/DL — HIGH
WBC # BLD: 6.9 K/UL — SIGNIFICANT CHANGE UP (ref 3.8–10.5)
WBC # FLD AUTO: 6.9 K/UL — SIGNIFICANT CHANGE UP (ref 3.8–10.5)

## 2024-01-25 PROCEDURE — 99239 HOSP IP/OBS DSCHRG MGMT >30: CPT

## 2024-01-25 RX ORDER — TAMSULOSIN HYDROCHLORIDE 0.4 MG/1
1 CAPSULE ORAL
Qty: 14 | Refills: 0
Start: 2024-01-25 | End: 2024-02-07

## 2024-01-25 RX ORDER — ASPIRIN/CALCIUM CARB/MAGNESIUM 324 MG
1 TABLET ORAL
Qty: 30 | Refills: 0
Start: 2024-01-25 | End: 2024-02-23

## 2024-01-25 RX ORDER — METFORMIN HYDROCHLORIDE 850 MG/1
1 TABLET ORAL
Qty: 28 | Refills: 0
Start: 2024-01-25 | End: 2024-02-07

## 2024-01-25 RX ORDER — INSULIN GLARGINE 100 [IU]/ML
10 INJECTION, SOLUTION SUBCUTANEOUS EVERY MORNING
Refills: 0 | Status: DISCONTINUED | OUTPATIENT
Start: 2024-01-25 | End: 2024-01-25

## 2024-01-25 RX ORDER — METFORMIN HYDROCHLORIDE 850 MG/1
1 TABLET ORAL
Qty: 60 | Refills: 0
Start: 2024-01-25 | End: 2024-02-23

## 2024-01-25 RX ORDER — TAMSULOSIN HYDROCHLORIDE 0.4 MG/1
1 CAPSULE ORAL
Qty: 30 | Refills: 0
Start: 2024-01-25 | End: 2024-02-23

## 2024-01-25 RX ORDER — ASPIRIN/CALCIUM CARB/MAGNESIUM 324 MG
1 TABLET ORAL
Qty: 14 | Refills: 0
Start: 2024-01-25 | End: 2024-02-07

## 2024-01-25 RX ORDER — ATORVASTATIN CALCIUM 80 MG/1
1 TABLET, FILM COATED ORAL
Qty: 30 | Refills: 0
Start: 2024-01-25 | End: 2024-02-23

## 2024-01-25 RX ORDER — ATORVASTATIN CALCIUM 80 MG/1
1 TABLET, FILM COATED ORAL
Qty: 14 | Refills: 0
Start: 2024-01-25 | End: 2024-02-07

## 2024-01-25 RX ADMIN — Medication 3: at 10:06

## 2024-01-25 RX ADMIN — Medication 2: at 12:42

## 2024-01-25 RX ADMIN — Medication 2: at 17:47

## 2024-01-25 RX ADMIN — INSULIN GLARGINE 10 UNIT(S): 100 INJECTION, SOLUTION SUBCUTANEOUS at 12:43

## 2024-01-25 RX ADMIN — HEPARIN SODIUM 5000 UNIT(S): 5000 INJECTION INTRAVENOUS; SUBCUTANEOUS at 06:04

## 2024-01-25 NOTE — PROGRESS NOTE ADULT - PROBLEM SELECTOR PLAN 4
Pt states she was previously taking BP meds.   BP controlled, ctm
Pt states she was previously taking BP meds. BP trending down , monitor
Pt states she was previously taking BP meds.   BP controlled, ctm
Pt states she was previously taking BP meds. BP meds currently stable.  - continue to monitor

## 2024-01-25 NOTE — DISCHARGE NOTE PROVIDER - PROVIDER TOKENS
FREE:[LAST:[Primary Care Physician],PHONE:[(   )    -],FAX:[(   )    -],FOLLOWUP:[2 weeks]] FREE:[LAST:[[facility]],PHONE:[phone]],FAX:[(   )    -],ADDRESS:[Please call tomorrow to schedule an appt w/ a new PCP],FOLLOWUP:[1 month]]

## 2024-01-25 NOTE — DISCHARGE NOTE PROVIDER - NSDCMRMEDTOKEN_GEN_ALL_CORE_FT
Physical Therapy Daily Treatment Note    Date: 2019  Patient Name: Marshall Henry                   [facility]"  : 1994   MRN: [id number]  DOInjury: 19   DOSx: 3-1-19  Referring Provider: Dr. Mary Cardenas Diagnosis:   F34.740Y (ICD-10-CM) - Closed bimalleolar fracture of left ankle, initial encounter   S93.432A (ICD-10-CM) - Syndesmotic disruption of left ankle, initial encounter   Short Term goals (4 weeks)  · Decrease reported pain to 4/10  · Increase ROM   · Increase Strength    · Able to perform/complete the following functions/tasks: walk w/o device  · Lower Extremity Functional Scale (LEFS) 50% impairment     Long Term goals (8 weeks)  · Decrease reported pain to 2/10  · Increase ROM to symmetrical  · Increase strength to 5-/5  · Able to complete the following functions/tasks: walk 60 min w/o limp  · LEFS 25% impairment  · Independent with home exercise program (HEP)      Outcome Measure:  LEFS 72%  LEFS:  55% 19    S: doing pretty good. O: amb into clinic with no crutch  Time 8735-4281     Visit 12 Repeat outcome measure at mid point and end. Pain 5/10 With exs    ROM Left:   AROM: -5° Dorsiflexion,  40° Plantarflexion, 15° Inversion, 15° Eversion  PROM:  5° Dorsiflexion,  45° Plantarflexion, 20° Inversion, 30° Eversion     Modalities      Compression/ice      Manual            Stretch                  Exercise      bike 10 min     Ankle pump HEP    Ankle circles     Inversion/eversion     Dorsi/plantar  Blue 3 x 10     CR 20x     Marching 20x     Hamstring Curl       Leg press 60#  2 x 25 xx    Leg press calf raises 40#  2 x 25 xx    Step-ups - FWD 4\" 2 x 20     Step-ups - LAT 4\" 2 x 20     Step-ups - BWD 4\"  2 x 20 increase     Baps L2 20x  All directions     Gait training  // bars  No holding on   2 x 35 ft xx    Side stepping 2 x 35 ft xx    Retro walk      Gait training 2 x 135 ft  GT   A:  Tolerated well.    Increased swelling after tx.  inst pt to elevate/ice.   Pt in agreement  P: Continue with rehab plan  Deon Sims PTA    Treatment Charges: Mins Units   Initial Evaluation     Re-Evaluation     Ther Exercise         TE 30 2   Manual Therapy     MT     Ther Activities        TA     Gait Training          GT     Neuro Re-education NR     Modalities     Non-Billable Service Time     Other     Total Time/Units 30 2 aspirin 81 mg oral delayed release tablet: 1 tab(s) orally once a day  atorvastatin 40 mg oral tablet: 1 tab(s) orally once a day (at bedtime)  metFORMIN 500 mg oral tablet: 1 tab(s) orally 2 times a day  tamsulosin 0.4 mg oral capsule: 1 cap(s) orally once a day (at bedtime)

## 2024-01-25 NOTE — DISCHARGE NOTE PROVIDER - CARE PROVIDER_API CALL
Primary Care Physician,   Phone: (   )    -  Fax: (   )    -  Follow Up Time: 2 weeks   [facility],   Please call tomorrow to schedule an appt w/ a new PCP  Phone: [phone]  Fax: (   )    -  Follow Up Time: 1 month

## 2024-01-25 NOTE — DISCHARGE NOTE PROVIDER - NSDCCPCAREPLAN_GEN_ALL_CORE_FT
PRINCIPAL DISCHARGE DIAGNOSIS  Diagnosis: MICHELLE (acute kidney injury)  Assessment and Plan of Treatment: you had urinary retention and dehydration,  kidney function improved with fluids and espinoza catheter, catheter now removed, continue take flomax, take stool softeners like miralax      SECONDARY DISCHARGE DIAGNOSES  Diagnosis: DM (diabetes mellitus)  Assessment and Plan of Treatment: take metformin and continue home medications, f/up with your primary  physician    Diagnosis: CAD (coronary artery disease)  Assessment and Plan of Treatment: continue take aspirin and atorvastatin, f/up with your cardiologist    Diagnosis: MICHELLE (acute kidney injury)  Assessment and Plan of Treatment:

## 2024-01-25 NOTE — PROGRESS NOTE ADULT - PROBLEM SELECTOR PLAN 6
d/w daughter at bedside , does not know patients meds, deficient proficiency in english,  does not speak dialect   - med rec pharmacist emailed
Attempted to call family multiple times however goes straight to voicemail  - med rec pharmacist emailed

## 2024-01-25 NOTE — PROGRESS NOTE ADULT - PROBLEM SELECTOR PLAN 5
Pt states 3 stents placed 10 months ago. Pt unsure if on anti-platelets.  - will start asa 81 mg QD
Pt states 3 stents placed 10 months ago. Pt unsure if on anti-platelets.  - cw asa  -start statin
Pt states 3 stents placed 10 months ago. Pt unsure if on anti-platelets.  - will start asa 81 mg QD
Pt states 3 stents placed 10 months ago. Pt unsure if on anti-platelets.  - cw asa  - started atorastatin 40mg hs, dyslipidemia on lipid profile

## 2024-01-25 NOTE — PROGRESS NOTE ADULT - PROBLEM SELECTOR PLAN 1
Elevated Cr to 2.22 --> 1.91 after IVF. No previous baseline. FeNA consistent with post-renal however w/o difficulty urinating.  however , US and bladder scan show retention, place Espinoza , start Flomax   - MICHELLE resolved s/p IVF  and bladder decompression  - renal US: mild L hydro, renal cysts, distended bladder  - CT abd/pelvis (1/24) no hydronephrosis with espinoza in place  - Espinoza removed last night and thus far voiding  - Ucx normal gu lucita, got 3 doses of CTX, dc abx  - pt is constipated, smog enema x1 and c/w bowel regimen  -c/w flomax  - Dispo: PT recs no skilled needs, home today if voiding

## 2024-01-25 NOTE — CHART NOTE - NSCHARTNOTEFT_GEN_A_CORE
As per pt family, pt is w/out insurance.   30 day supply medications sent to pt pharmacy [facility]-discussed w/ pharmacist total for medications is $48.  Discussed w/ pt daughter Dinora, pt family agreeable to pay for medications.  Advised Dinora that pt will need to follow up w/ [facility] clinic #[phone].     Andrea Scott PA-C  pager [phone] As per pt family, pt is w/out insurance.   30 day supply medications sent to pt pharmacy [facility]-discussed w/ pharmacist total for medications is $48.  Discussed w/ pt daughter Dinora, pt family agreeable to pay for medications.  Advised Dinora to call [facility] clinic #[phone] to schedule a follow up visit w/ a PCP.   Dinora endorsed understanding and states she will call in the AM for an appt.  Dr. King made aware and agrees to plan.     Andrea Scott PA-C  pager [phone] As per pt family, pt is w/out insurance.   30 day supply medications sent to pt pharmacy [facility]-discussed w/ pharmacist total for medications is $48.  Discussed w/ pt daughter Dinora, pt family agreeable to pay for medications.  [facility] #[phone] to schedule a follow up visit w/ a PCP.   Dinora endorsed understanding and states she will call in the AM for an appt.  Dr. King made aware.    Andrea Scott PA-C  pager [phone]

## 2024-01-25 NOTE — DISCHARGE NOTE PROVIDER - HOSPITAL COURSE
Pt is a 68-year-old female, history of type 2 diabetes, coronary artery disease status post 3 stents (10 months ago per pt), hypertension, hyperlipidemia, asthma, presenting with a chief complaint of chronic generalized weakness and  feeling unwell for the past 2 months. Pt found to have elevated Cr, suspect MICHELLE and hyperkalemia. Pt admitted for further management.        Problem/Plan - 1:  ·  Problem: MICHELLE (acute kidney injury).   ·  Plan: Elevated Cr to 2.22 --> 1.91 after IVF. No previous baseline. FeNA consistent with post-renal however w/o difficulty urinating.  however , US and bladder scan show retention, place Espinoza , start Flomax   - MICHELLE resolved s/p IVF  and bladder decompression  - renal US: mild L hydro, renal cysts, distended bladder  - CT abd/pelvis (1/24) no hydronephrosis with espinoza in place  - Espinoza removed last night and thus far voiding  - Ucx normal gu lucita, got 3 doses of CTX, dc abx  - pt is constipated, smog enema x1 and c/w bowel regimen  -c/w flomax  - Dispo: PT recs no skilled needs, discharge home 1/25     Problem/Plan - 2:  ·  Problem: Hyperkalemia.   ·  Plan: K initially 6.0, s/p lokelma 10 mg, regular insulin 5 units w/ repeat 6.3 (mildly hemolyzed). EKG w/o peaked T waves.  - resolved K 5.0 today  - hyperkalemia on admission d/t obstruction/michelle  -ctm.     Problem/Plan - 3:  ·  Problem: DM (diabetes mellitus).   ·  Plan: Pt states she takes insulin, unclear how much. Glucose mildly elevated.  - start low ISS and adjust as needed  - f/u a1c is 8.7  - ordered lantus 10U am.     Problem/Plan - 4:  ·  Problem: HTN (hypertension).   ·  Plan: Pt states she was previously taking BP meds.   BP controlled, ctm.     Problem/Plan - 5:  ·  Problem: CAD (coronary artery disease).   ·  Plan: Pt states 3 stents placed 10 months ago. Pt unsure if on anti-platelets.  - cw asa  - started atorastatin 40mg hs, dyslipidemia on lipid profile.

## 2024-01-25 NOTE — PROGRESS NOTE ADULT - PROBLEM SELECTOR PLAN 2
K initially 6.0, s/p lokelma 10 mg, regular insulin 5 units w/ repeat 6.3 (mildly hemolyzed). EKG w/o peaked T waves.  - resolved K 5.0 today  - hyperkalemia on admission d/t obstruction/arabella  -ctm
K initially 6.0, s/p lokelma 10 mg, regular insulin 5 units w/ repeat 6.3 (mildly hemolyzed). EKG w/o peaked T waves.  - resolved K 5.1 today, given lokelma  - hyperkalemia on admission d/t obstruction/arabella  -ctm
K initially 6.0, s/p lokelma 10 mg, regular insulin 5 units w/ repeat 6.3 (mildly hemolyzed). EKG w/o peaked T waves.  - repeat lokelam 10 mg x1, and regular insulin 5 units x1 w/ dextrose  - f/u repeat K  - monitor on tele if hyperkalemia persists
K initially 6.0, s/p lokelma 10 mg, regular insulin 5 units w/ repeat 6.3 (mildly hemolyzed). EKG w/o peaked T waves.  - repeat lokelam 10 mg x1, and regular insulin 5 units x1 w/ dextrose  -again hyperkalemia , hyponatremia , hypotension , concern of hypercorticism , check am cortisol  and further work-up depends   - monitor on tele as  hyperkalemia persists

## 2024-01-25 NOTE — PROGRESS NOTE ADULT - SUBJECTIVE AND OBJECTIVE BOX
Dr. Marilia King  Pager [phone]    PROGRESS NOTE:     Patient is a 68y old  Female who presents with a chief complaint of hyperkalemia, MICHELLE (24 Jan 2024 15:17)      SUBJECTIVE / OVERNIGHT EVENTS: pt feels better, wants to go home   ADDITIONAL REVIEW OF SYSTEMS: espinoza removed last night, voiding , reports constipation, no BM for a week     MEDICATIONS  (STANDING):  aspirin enteric coated 81 milliGRAM(s) Oral daily  atorvastatin 40 milliGRAM(s) Oral at bedtime  dextrose 5%. 1000 milliLiter(s) (50 mL/Hr) IV Continuous <Continuous>  dextrose 50% Injectable 25 Gram(s) IV Push once  glucagon  Injectable 1 milliGRAM(s) IntraMuscular once  heparin   Injectable 5000 Unit(s) SubCutaneous every 8 hours  insulin glargine Injectable (LANTUS) 10 Unit(s) SubCutaneous every morning  insulin lispro (ADMELOG) corrective regimen sliding scale   SubCutaneous three times a day before meals  insulin lispro (ADMELOG) corrective regimen sliding scale   SubCutaneous at bedtime  polyethylene glycol 3350 17 Gram(s) Oral daily  senna 2 Tablet(s) Oral at bedtime  tamsulosin 0.4 milliGRAM(s) Oral at bedtime    MEDICATIONS  (PRN):  acetaminophen     Tablet .. 650 milliGRAM(s) Oral every 6 hours PRN Temp greater or equal to 38C (100.4F), Mild Pain (1 - 3)  dextrose Oral Gel 15 Gram(s) Oral once PRN Blood Glucose LESS THAN 70 milliGRAM(s)/deciliter      CAPILLARY BLOOD GLUCOSE      POCT Blood Glucose.: 222 mg/dL (25 Jan 2024 12:30)  POCT Blood Glucose.: 264 mg/dL (25 Jan 2024 10:04)  POCT Blood Glucose.: 263 mg/dL (25 Jan 2024 08:29)  POCT Blood Glucose.: 175 mg/dL (24 Jan 2024 21:57)  POCT Blood Glucose.: 177 mg/dL (24 Jan 2024 17:46)    I&O's Summary    24 Jan 2024 07:01  -  25 Jan 2024 07:00  --------------------------------------------------------  IN: 200 mL / OUT: 1595 mL / NET: -1395 mL        PHYSICAL EXAM:  Vital Signs Last 24 Hrs  T(C): 36.9 (25 Jan 2024 08:50), Max: 36.9 (24 Jan 2024 17:55)  T(F): 98.5 (25 Jan 2024 08:50), Max: 98.5 (25 Jan 2024 08:50)  HR: 101 (25 Jan 2024 08:50) (90 - 101)  BP: 126/65 (25 Jan 2024 08:50) (101/71 - 126/65)  BP(mean): --  RR: 18 (25 Jan 2024 08:50) (18 - 18)  SpO2: 100% (25 Jan 2024 08:50) (96% - 100%)    Parameters below as of 25 Jan 2024 08:50  Patient On (Oxygen Delivery Method): room air      CONSTITUTIONAL: NAD,   ENMT: Moist oral mucosa, no pharyngeal injection or exudates;   RESPIRATORY: Normal respiratory effort; lungs are clear to auscultation bilaterally  CARDIOVASCULAR: Regular rate and rhythm, normal S1 and S2,  No lower extremity edema;   ABDOMEN: Nontender to palpation, normoactive bowel sounds, no rebound/guarding; Espinoza removed, voiding   PSYCH: A+O to person, place; affect appropriate, hard of hearing   NEUROLOGY: CN 2-12 are intact and symmetric; no gross sensory deficits   SKIN: No rashes; no palpable lesions    LABS:                        9.4    6.90  )-----------( 306      ( 25 Jan 2024 06:50 )             28.9     01-25    135  |  104  |  16  ----------------------------<  190<H>  5.0   |  19<L>  |  1.06    Ca    9.0      25 Jan 2024 06:50  Phos  3.2     01-25  Mg     2.10     01-25            Urinalysis Basic - ( 25 Jan 2024 06:50 )    Color: x / Appearance: x / SG: x / pH: x  Gluc: 190 mg/dL / Ketone: x  / Bili: x / Urobili: x   Blood: x / Protein: x / Nitrite: x   Leuk Esterase: x / RBC: x / WBC x   Sq Epi: x / Non Sq Epi: x / Bacteria: x          RADIOLOGY & ADDITIONAL TESTS:  Results Reviewed:   Imaging Personally Reviewed:  < from: CT Abdomen and Pelvis No Cont (01.24.24 @ 17:18) >  LOWER CHEST: Within normal limits.    LIVER: Within normal limits.  BILE DUCTS: Normal caliber.  GALLBLADDER: Within normal limits.  SPLEEN: Within normal limits.  PANCREAS: Within normal limits.  ADRENALS: Within normal limits.  KIDNEYS/URETERS: No hydronephrosis. Vascular calcifications and/or   nonobstructing renalcalculi. Bilateral renal cysts. Bilateral hyperdense   lesions, likely corresponding to complex cysts seen on prior renal   ultrasound.    BLADDER: Espinoza catheter.  REPRODUCTIVE ORGANS: Uterus and adnexa within normal limits.    BOWEL: No bowel obstruction. Appendix is normal.  PERITONEUM: No ascites.  VESSELS: Atherosclerotic changes.  RETROPERITONEUM/LYMPH NODES: No lymphadenopathy.  ABDOMINAL WALL: Within normal limits.  BONES: Within normal limits.      Espinoza catheter in place. No hydronephrosis.      Electrocardiogram Personally Reviewed:    COORDINATION OF CARE:  Care Discussed with Consultants/Other Providers [Y/N]:  Prior or Outpatient Records Reviewed [Y/N]:  
Medicine Progress Note    Patient is a 68y old  Female who presents with a chief complaint of hyperkalemia, MICHELLE (22 Jan 2024 15:26)      SUBJECTIVE / OVERNIGHT EVENTS: MICHELLE resolved , s/l Moran   But hyperkalemia /hyponatremia persists     ADDITIONAL REVIEW OF SYSTEMS: negative     MEDICATIONS  (STANDING):  aspirin enteric coated 81 milliGRAM(s) Oral daily  calcium gluconate IVPB 1 Gram(s) IV Intermittent once  cefTRIAXone   IVPB 1000 milliGRAM(s) IV Intermittent every 24 hours  dextrose 5%. 1000 milliLiter(s) (50 mL/Hr) IV Continuous <Continuous>  dextrose 50% Injectable 25 Gram(s) IV Push once  glucagon  Injectable 1 milliGRAM(s) IntraMuscular once  heparin   Injectable 5000 Unit(s) SubCutaneous every 8 hours  insulin lispro (ADMELOG) corrective regimen sliding scale   SubCutaneous three times a day before meals  insulin lispro (ADMELOG) corrective regimen sliding scale   SubCutaneous at bedtime  sodium chloride 0.9%. 1000 milliLiter(s) (100 mL/Hr) IV Continuous <Continuous>    MEDICATIONS  (PRN):  acetaminophen     Tablet .. 650 milliGRAM(s) Oral every 6 hours PRN Temp greater or equal to 38C (100.4F), Mild Pain (1 - 3)  dextrose Oral Gel 15 Gram(s) Oral once PRN Blood Glucose LESS THAN 70 milliGRAM(s)/deciliter    CAPILLARY BLOOD GLUCOSE  182 (22 Jan 2024 17:16)      POCT Blood Glucose.: 144 mg/dL (23 Jan 2024 13:03)  POCT Blood Glucose.: 202 mg/dL (23 Jan 2024 09:08)  POCT Blood Glucose.: 186 mg/dL (23 Jan 2024 08:06)  POCT Blood Glucose.: 204 mg/dL (22 Jan 2024 22:28)  POCT Blood Glucose.: 180 mg/dL (22 Jan 2024 16:53)    I&O's Summary    22 Jan 2024 07:01  -  23 Jan 2024 07:00  --------------------------------------------------------  IN: 0 mL / OUT: 800 mL / NET: -800 mL        PHYSICAL EXAM:  Vital Signs Last 24 Hrs  T(C): 36.8 (23 Jan 2024 13:28), Max: 36.8 (22 Jan 2024 15:51)  T(F): 98.2 (23 Jan 2024 13:28), Max: 98.3 (23 Jan 2024 05:12)  HR: 105 (23 Jan 2024 13:28) (92 - 110)  BP: 108/67 (23 Jan 2024 13:28) (108/67 - 122/66)  BP(mean): --  RR: 17 (23 Jan 2024 13:28) (17 - 20)  SpO2: 99% (23 Jan 2024 13:28) (96% - 100%)    Parameters below as of 23 Jan 2024 13:28  Patient On (Oxygen Delivery Method): room air      CONSTITUTIONAL: NAD,   ENMT: Moist oral mucosa, no pharyngeal injection or exudates;   RESPIRATORY: Normal respiratory effort; lungs are clear to auscultation bilaterally  CARDIOVASCULAR: Regular rate and rhythm, normal S1 and S2,  No lower extremity edema;   ABDOMEN: Nontender to palpation, normoactive bowel sounds, no rebound/guarding; Moran   PSYCH: A+O to person, place, and time; affect appropriate  NEUROLOGY: CN 2-12 are intact and symmetric; no gross sensory deficits   SKIN: No rashes; no palpable lesions    LABS:                        11.2   11.43 )-----------( 397      ( 23 Jan 2024 06:05 )             34.0     01-23    136  |  104  |  35<H>  ----------------------------<  60<L>  4.8   |  20<L>  |  1.18    Ca    9.1      23 Jan 2024 10:57  Phos  2.6     01-23  Mg     2.10     01-23            Urinalysis Basic - ( 23 Jan 2024 10:57 )    Color: x / Appearance: x / SG: x / pH: x  Gluc: 60 mg/dL / Ketone: x  / Bili: x / Urobili: x   Blood: x / Protein: x / Nitrite: x   Leuk Esterase: x / RBC: x / WBC x   Sq Epi: x / Non Sq Epi: x / Bacteria: x        Culture - Urine (collected 21 Jan 2024 16:49)  Source: Clean Catch Clean Catch (Midstream)  Final Report (22 Jan 2024 13:01):    <10,000 CFU/mL Normal Urogenital Magdalena          RADIOLOGY & ADDITIONAL TESTS:  Imaging from Last 24 Hours:    Electrocardiogram/QTc Interval:    COORDINATION OF CARE:  Care Discussed with Consultants/Other Providers: SAMUEL   
Medicine Progress Note    Patient is a 68y old  Female who presents with a chief complaint of hyperkalemia, MICHELLE (21 Jan 2024 20:29)      SUBJECTIVE / OVERNIGHT EVENTS:  retains urine 668 ml   UA slight positive   US : MPRESSION:  *  Bilateral renal cysts and parenchymal disease.  *  Mild left hydronephrosis.  *  Prominently distended bladder.        ADDITIONAL REVIEW OF SYSTEMS:    MEDICATIONS  (STANDING):  aspirin enteric coated 81 milliGRAM(s) Oral daily  cefTRIAXone   IVPB 1000 milliGRAM(s) IV Intermittent every 24 hours  dextrose 5%. 1000 milliLiter(s) (50 mL/Hr) IV Continuous <Continuous>  dextrose 50% Injectable 25 Gram(s) IV Push once  glucagon  Injectable 1 milliGRAM(s) IntraMuscular once  heparin   Injectable 5000 Unit(s) SubCutaneous every 8 hours  insulin lispro (ADMELOG) corrective regimen sliding scale   SubCutaneous at bedtime  insulin lispro (ADMELOG) corrective regimen sliding scale   SubCutaneous three times a day before meals  sodium chloride 0.9%. 1000 milliLiter(s) (100 mL/Hr) IV Continuous <Continuous>    MEDICATIONS  (PRN):  acetaminophen     Tablet .. 650 milliGRAM(s) Oral every 6 hours PRN Temp greater or equal to 38C (100.4F), Mild Pain (1 - 3)  dextrose Oral Gel 15 Gram(s) Oral once PRN Blood Glucose LESS THAN 70 milliGRAM(s)/deciliter    CAPILLARY BLOOD GLUCOSE      POCT Blood Glucose.: 234 mg/dL (22 Jan 2024 14:52)  POCT Blood Glucose.: 234 mg/dL (22 Jan 2024 14:52)  POCT Blood Glucose.: 277 mg/dL (22 Jan 2024 08:42)  POCT Blood Glucose.: 185 mg/dL (22 Jan 2024 06:32)  POCT Blood Glucose.: 156 mg/dL (22 Jan 2024 02:46)  POCT Blood Glucose.: 155 mg/dL (22 Jan 2024 01:22)  POCT Blood Glucose.: 284 mg/dL (21 Jan 2024 22:56)  POCT Blood Glucose.: 364 mg/dL (21 Jan 2024 22:06)  POCT Blood Glucose.: 184 mg/dL (21 Jan 2024 21:23)  POCT Blood Glucose.: 145 mg/dL (21 Jan 2024 19:06)    I&O's Summary      PHYSICAL EXAM:  Vital Signs Last 24 Hrs  T(C): 36.1 (22 Jan 2024 12:04), Max: 36.7 (22 Jan 2024 06:35)  T(F): 97 (22 Jan 2024 12:04), Max: 98.1 (22 Jan 2024 06:35)  HR: 92 (22 Jan 2024 12:04) (88 - 115)  BP: 106/90 (22 Jan 2024 12:04) (99/65 - 122/66)  BP(mean): 83 (22 Jan 2024 06:35) (83 - 83)  RR: 20 (22 Jan 2024 12:04) (16 - 20)  SpO2: 100% (22 Jan 2024 12:04) (98% - 100%)    Parameters below as of 22 Jan 2024 12:04  Patient On (Oxygen Delivery Method): room air      CONSTITUTIONAL: NAD,   ENMT: Moist oral mucosa, no pharyngeal injection or exudates;  RESPIRATORY: Normal respiratory effort; lungs are clear to auscultation bilaterally  CARDIOVASCULAR: Regular rate and rhythm, normal S1 and S2,; No lower extremity edema;   ABDOMEN: Nontender to palpation, distended   PSYCH: A+O to person, place, and time; affect appropriate  NEUROLOGY: CN 2-12 are intact and symmetric; no gross sensory deficits   SKIN: No rashes; no palpable lesions    LABS:                        11.5   11.49 )-----------( 365      ( 22 Jan 2024 05:16 )             35.3     01-22    131<L>  |  97<L>  |  52<H>  ----------------------------<  189<H>  5.1   |  18<L>  |  1.65<H>    Ca    9.6      22 Jan 2024 05:16  Phos  4.6     01-22  Mg     1.50     01-22    TPro  8.7<H>  /  Alb  4.3  /  TBili  0.2  /  DBili  x   /  AST  11  /  ALT  5   /  AlkPhos  145<H>  01-21    PT/INR - ( 21 Jan 2024 14:01 )   PT: 10.5 sec;   INR: 0.93 ratio         PTT - ( 21 Jan 2024 14:01 )  PTT:32.4 sec      Urinalysis Basic - ( 22 Jan 2024 05:16 )    Color: x / Appearance: x / SG: x / pH: x  Gluc: 189 mg/dL / Ketone: x  / Bili: x / Urobili: x   Blood: x / Protein: x / Nitrite: x   Leuk Esterase: x / RBC: x / WBC x   Sq Epi: x / Non Sq Epi: x / Bacteria: x        Culture - Urine (collected 21 Jan 2024 16:49)  Source: Clean Catch Clean Catch (Midstream)  Final Report (22 Jan 2024 13:01):    <10,000 CFU/mL Normal Urogenital Magdalena          RADIOLOGY & ADDITIONAL TESTS:  Imaging from Last 24 Hours:    Electrocardiogram/QTc Interval:    COORDINATION OF CARE:  Care Discussed with Consultants/Other Providers: SAMUEL   
Dr. Marilia King  Pager [phone]    PROGRESS NOTE:     Patient is a 68y old  Female who presents with a chief complaint of hyperkalemia, MICHELLE (23 Jan 2024 14:51)      SUBJECTIVE / OVERNIGHT EVENTS: pt appears confused  ADDITIONAL REVIEW OF SYSTEMS: afebrile, no chest pain/sob     MEDICATIONS  (STANDING):  aspirin enteric coated 81 milliGRAM(s) Oral daily  atorvastatin 40 milliGRAM(s) Oral at bedtime  cefTRIAXone   IVPB 1000 milliGRAM(s) IV Intermittent every 24 hours  dextrose 5%. 1000 milliLiter(s) (50 mL/Hr) IV Continuous <Continuous>  dextrose 50% Injectable 25 Gram(s) IV Push once  glucagon  Injectable 1 milliGRAM(s) IntraMuscular once  heparin   Injectable 5000 Unit(s) SubCutaneous every 8 hours  insulin lispro (ADMELOG) corrective regimen sliding scale   SubCutaneous at bedtime  insulin lispro (ADMELOG) corrective regimen sliding scale   SubCutaneous three times a day before meals  polyethylene glycol 3350 17 Gram(s) Oral daily  senna 2 Tablet(s) Oral at bedtime  sodium chloride 0.9%. 1000 milliLiter(s) (100 mL/Hr) IV Continuous <Continuous>  tamsulosin 0.4 milliGRAM(s) Oral at bedtime    MEDICATIONS  (PRN):  acetaminophen     Tablet .. 650 milliGRAM(s) Oral every 6 hours PRN Temp greater or equal to 38C (100.4F), Mild Pain (1 - 3)  dextrose Oral Gel 15 Gram(s) Oral once PRN Blood Glucose LESS THAN 70 milliGRAM(s)/deciliter      CAPILLARY BLOOD GLUCOSE      POCT Blood Glucose.: 195 mg/dL (24 Jan 2024 12:39)  POCT Blood Glucose.: 207 mg/dL (24 Jan 2024 08:14)  POCT Blood Glucose.: 220 mg/dL (24 Jan 2024 01:05)  POCT Blood Glucose.: 245 mg/dL (23 Jan 2024 21:43)  POCT Blood Glucose.: 204 mg/dL (23 Jan 2024 17:17)    I&O's Summary    23 Jan 2024 07:01  -  24 Jan 2024 07:00  --------------------------------------------------------  IN: 2100 mL / OUT: 2650 mL / NET: -550 mL        PHYSICAL EXAM:  Vital Signs Last 24 Hrs  T(C): 36.3 (24 Jan 2024 09:52), Max: 36.7 (23 Jan 2024 21:18)  T(F): 97.4 (24 Jan 2024 09:52), Max: 98 (23 Jan 2024 21:18)  HR: 96 (24 Jan 2024 09:52) (90 - 101)  BP: 121/67 (24 Jan 2024 09:52) (121/67 - 136/60)  BP(mean): --  RR: 18 (24 Jan 2024 09:52) (17 - 18)  SpO2: 96% (24 Jan 2024 09:52) (96% - 99%)    Parameters below as of 24 Jan 2024 09:52  Patient On (Oxygen Delivery Method): room air    CONSTITUTIONAL: NAD,   ENMT: Moist oral mucosa, no pharyngeal injection or exudates;   RESPIRATORY: Normal respiratory effort; lungs are clear to auscultation bilaterally  CARDIOVASCULAR: Regular rate and rhythm, normal S1 and S2,  No lower extremity edema;   ABDOMEN: Nontender to palpation, normoactive bowel sounds, no rebound/guarding; Moran   PSYCH: A+O to person, place; affect appropriate, hard of hearing   NEUROLOGY: CN 2-12 are intact and symmetric; no gross sensory deficits   SKIN: No rashes; no palpable lesions    LABS:                        9.1    9.41  )-----------( 335      ( 24 Jan 2024 07:00 )             28.0     01-24    137  |  106  |  22  ----------------------------<  222<H>  5.1   |  20<L>  |  1.08    Ca    8.8      24 Jan 2024 07:00  Phos  2.7     01-24  Mg     1.20     01-24            Urinalysis Basic - ( 24 Jan 2024 07:00 )    Color: x / Appearance: x / SG: x / pH: x  Gluc: 222 mg/dL / Ketone: x  / Bili: x / Urobili: x   Blood: x / Protein: x / Nitrite: x   Leuk Esterase: x / RBC: x / WBC x   Sq Epi: x / Non Sq Epi: x / Bacteria: x        Culture - Urine (collected 21 Jan 2024 16:49)  Source: Clean Catch Clean Catch (Midstream)  Final Report (22 Jan 2024 13:01):    <10,000 CFU/mL Normal Urogenital Magdalena        RADIOLOGY & ADDITIONAL TESTS:  Results Reviewed:   Imaging Personally Reviewed:  < from: US Kidney and Bladder (01.22.24 @ 07:42) >    IMPRESSION:  *  Bilateral renal cysts and parenchymal disease.  *  Mild left hydronephrosis.  *  Prominently distended bladder.        Electrocardiogram Personally Reviewed:    COORDINATION OF CARE:  Care Discussed with Consultants/Other Providers [Y/N]:  Prior or Outpatient Records Reviewed [Y/N]:

## 2024-01-25 NOTE — DISCHARGE NOTE NURSING/CASE MANAGEMENT/SOCIAL WORK - PATIENT PORTAL LINK FT
You can access the FollowMyHealth Patient Portal offered by [facility] by registering at the following website: [email or web address]. By joining [employer]’s FollowMyHealth portal, you will also be able to view your health information using other applications (apps) compatible with our system.

## 2024-01-25 NOTE — PROGRESS NOTE ADULT - PROBLEM SELECTOR PLAN 3
Pt states she takes insulin, unclear how much. Glucose mildly elevated.  - start low ISS and adjust as needed  - f/u a1c is 8.7
Pt states she takes insulin, unclear how much. Glucose mildly elevated.  - start low ISS and adjust as needed  - f/u a1c is 8.7  - ordered lantus 10U am
Pt states she takes insulin, unclear how much. Glucose mildly elevated.  - start low ISS and adjust as needed  - f/u a1c is 8.7
Pt states she takes insulin, unclear how much. Glucose mildly elevated.  - start low ISS and adjust as needed  - f/u a1c is 8.7

## 2024-01-25 NOTE — PROGRESS NOTE ADULT - PROBLEM SELECTOR PROBLEM 1
MICHELLE (acute kidney injury)

## 2024-01-25 NOTE — PROGRESS NOTE ADULT - PROBLEM SELECTOR PLAN 7
DVT prophylaxis: hep subq  Diet: dash/tlc, cc, low potassium    Patient is very hard of hearing , d/w granddaughter at bedside on 1/25  Dispo: DC home today  Time spent on discharge 32 minutes coordinating discharge plan and discussing with patient and family.
DVT prophylaxis: hep subq  Diet: dash/tlc, cc, low potassium    Patient is very hard of hearing , can't hear the 
DVT prophylaxis: hep subq  Diet: dash/tlc, cc, low potassium    Patient is very hard of hearing , can't hear the   Ear devices no battery
DVT prophylaxis: hep subq  Diet: dash/tlc, cc, low potassium    Patient is very hard of hearing , can't hear the   Ear devices no battery

## 2024-02-03 ENCOUNTER — INPATIENT (INPATIENT)
Facility: HOSPITAL | Age: 69
LOS: 2 days | Discharge: HOME | End: 2024-02-06
Attending: HOSPITALIST | Admitting: HOSPITALIST
Payer: MEDICAID

## 2024-02-03 VITALS
OXYGEN SATURATION: 98 % | DIASTOLIC BLOOD PRESSURE: 80 MMHG | TEMPERATURE: 98 F | SYSTOLIC BLOOD PRESSURE: 131 MMHG | HEART RATE: 111 BPM | RESPIRATION RATE: 17 BRPM

## 2024-02-03 DIAGNOSIS — R07.9 CHEST PAIN, UNSPECIFIED: ICD-10-CM

## 2024-02-03 PROBLEM — J45.909 UNSPECIFIED ASTHMA, UNCOMPLICATED: Chronic | Status: ACTIVE | Noted: 2024-01-21

## 2024-02-03 PROBLEM — I25.10 ATHEROSCLEROTIC HEART DISEASE OF NATIVE CORONARY ARTERY WITHOUT ANGINA PECTORIS: Chronic | Status: ACTIVE | Noted: 2024-01-21

## 2024-02-03 PROBLEM — I10 ESSENTIAL (PRIMARY) HYPERTENSION: Chronic | Status: ACTIVE | Noted: 2024-01-21

## 2024-02-03 PROBLEM — E11.9 TYPE 2 DIABETES MELLITUS WITHOUT COMPLICATIONS: Chronic | Status: ACTIVE | Noted: 2024-01-21

## 2024-02-03 LAB
ALBUMIN SERPL ELPH-MCNC: 4.3 G/DL — SIGNIFICANT CHANGE UP (ref 3.3–5)
ALP SERPL-CCNC: 104 U/L — SIGNIFICANT CHANGE UP (ref 40–120)
ALT FLD-CCNC: 7 U/L — SIGNIFICANT CHANGE UP (ref 4–33)
ANION GAP SERPL CALC-SCNC: 21 MMOL/L — HIGH (ref 7–14)
APPEARANCE UR: CLEAR — SIGNIFICANT CHANGE UP
AST SERPL-CCNC: 23 U/L — SIGNIFICANT CHANGE UP (ref 4–32)
BACTERIA # UR AUTO: NEGATIVE /HPF — SIGNIFICANT CHANGE UP
BASOPHILS # BLD AUTO: 0.09 K/UL — SIGNIFICANT CHANGE UP (ref 0–0.2)
BASOPHILS NFR BLD AUTO: 1 % — SIGNIFICANT CHANGE UP (ref 0–2)
BILIRUB SERPL-MCNC: 0.3 MG/DL — SIGNIFICANT CHANGE UP (ref 0.2–1.2)
BILIRUB UR-MCNC: NEGATIVE — SIGNIFICANT CHANGE UP
BUN SERPL-MCNC: 16 MG/DL — SIGNIFICANT CHANGE UP (ref 7–23)
CALCIUM SERPL-MCNC: 9 MG/DL — SIGNIFICANT CHANGE UP (ref 8.4–10.5)
CAST: 14 /LPF — HIGH (ref 0–4)
CHLORIDE SERPL-SCNC: 95 MMOL/L — LOW (ref 98–107)
CO2 SERPL-SCNC: 18 MMOL/L — LOW (ref 22–31)
COLOR SPEC: YELLOW — SIGNIFICANT CHANGE UP
CREAT ?TM UR-MCNC: 48 MG/DL — SIGNIFICANT CHANGE UP
CREAT SERPL-MCNC: 1.35 MG/DL — HIGH (ref 0.5–1.3)
DIFF PNL FLD: NEGATIVE — SIGNIFICANT CHANGE UP
EGFR: 42 ML/MIN/1.73M2 — LOW
EOSINOPHIL # BLD AUTO: 0.48 K/UL — SIGNIFICANT CHANGE UP (ref 0–0.5)
EOSINOPHIL NFR BLD AUTO: 5.1 % — SIGNIFICANT CHANGE UP (ref 0–6)
FLUAV AG NPH QL: SIGNIFICANT CHANGE UP
FLUBV AG NPH QL: SIGNIFICANT CHANGE UP
GLUCOSE BLDC GLUCOMTR-MCNC: 125 MG/DL — HIGH (ref 70–99)
GLUCOSE BLDC GLUCOMTR-MCNC: 214 MG/DL — HIGH (ref 70–99)
GLUCOSE SERPL-MCNC: 154 MG/DL — HIGH (ref 70–99)
GLUCOSE UR QL: NEGATIVE MG/DL — SIGNIFICANT CHANGE UP
HCT VFR BLD CALC: 31.8 % — LOW (ref 34.5–45)
HGB BLD-MCNC: 10.4 G/DL — LOW (ref 11.5–15.5)
IANC: 6.42 K/UL — SIGNIFICANT CHANGE UP (ref 1.8–7.4)
IMM GRANULOCYTES NFR BLD AUTO: 0.6 % — SIGNIFICANT CHANGE UP (ref 0–0.9)
KETONES UR-MCNC: NEGATIVE MG/DL — SIGNIFICANT CHANGE UP
LEUKOCYTE ESTERASE UR-ACNC: NEGATIVE — SIGNIFICANT CHANGE UP
LIDOCAIN IGE QN: 35 U/L — SIGNIFICANT CHANGE UP (ref 7–60)
LYMPHOCYTES # BLD AUTO: 1.69 K/UL — SIGNIFICANT CHANGE UP (ref 1–3.3)
LYMPHOCYTES # BLD AUTO: 18.1 % — SIGNIFICANT CHANGE UP (ref 13–44)
MAGNESIUM SERPL-MCNC: 1.4 MG/DL — LOW (ref 1.6–2.6)
MCHC RBC-ENTMCNC: 29.6 PG — SIGNIFICANT CHANGE UP (ref 27–34)
MCHC RBC-ENTMCNC: 32.7 GM/DL — SIGNIFICANT CHANGE UP (ref 32–36)
MCV RBC AUTO: 90.6 FL — SIGNIFICANT CHANGE UP (ref 80–100)
MONOCYTES # BLD AUTO: 0.62 K/UL — SIGNIFICANT CHANGE UP (ref 0–0.9)
MONOCYTES NFR BLD AUTO: 6.6 % — SIGNIFICANT CHANGE UP (ref 2–14)
NEUTROPHILS # BLD AUTO: 6.42 K/UL — SIGNIFICANT CHANGE UP (ref 1.8–7.4)
NEUTROPHILS NFR BLD AUTO: 68.6 % — SIGNIFICANT CHANGE UP (ref 43–77)
NITRITE UR-MCNC: NEGATIVE — SIGNIFICANT CHANGE UP
NRBC # BLD: 0 /100 WBCS — SIGNIFICANT CHANGE UP (ref 0–0)
NRBC # FLD: 0 K/UL — SIGNIFICANT CHANGE UP (ref 0–0)
NT-PROBNP SERPL-SCNC: 6218 PG/ML — HIGH
OSMOLALITY UR: 363 MOSM/KG — SIGNIFICANT CHANGE UP (ref 50–1200)
PH UR: 5.5 — SIGNIFICANT CHANGE UP (ref 5–8)
PHOSPHATE SERPL-MCNC: 4 MG/DL — SIGNIFICANT CHANGE UP (ref 2.5–4.5)
PLATELET # BLD AUTO: 361 K/UL — SIGNIFICANT CHANGE UP (ref 150–400)
POTASSIUM SERPL-MCNC: 4.8 MMOL/L — SIGNIFICANT CHANGE UP (ref 3.5–5.3)
POTASSIUM SERPL-SCNC: 4.8 MMOL/L — SIGNIFICANT CHANGE UP (ref 3.5–5.3)
POTASSIUM UR-SCNC: 26 MMOL/L — SIGNIFICANT CHANGE UP
PROT ?TM UR-MCNC: 30 MG/DL — SIGNIFICANT CHANGE UP
PROT SERPL-MCNC: 8.1 G/DL — SIGNIFICANT CHANGE UP (ref 6–8.3)
PROT UR-MCNC: 30 MG/DL
PROT/CREAT UR-RTO: 0.6 RATIO — HIGH (ref 0–0.2)
RBC # BLD: 3.51 M/UL — LOW (ref 3.8–5.2)
RBC # FLD: 13.4 % — SIGNIFICANT CHANGE UP (ref 10.3–14.5)
RBC CASTS # UR COMP ASSIST: 0 /HPF — SIGNIFICANT CHANGE UP (ref 0–4)
REVIEW: SIGNIFICANT CHANGE UP
RSV RNA NPH QL NAA+NON-PROBE: SIGNIFICANT CHANGE UP
SARS-COV-2 RNA SPEC QL NAA+PROBE: SIGNIFICANT CHANGE UP
SODIUM SERPL-SCNC: 134 MMOL/L — LOW (ref 135–145)
SODIUM UR-SCNC: 94 MMOL/L — SIGNIFICANT CHANGE UP
SP GR SPEC: 1.01 — SIGNIFICANT CHANGE UP (ref 1–1.03)
SQUAMOUS # UR AUTO: 2 /HPF — SIGNIFICANT CHANGE UP (ref 0–5)
TROPONIN T, HIGH SENSITIVITY RESULT: 45 NG/L — SIGNIFICANT CHANGE UP
TROPONIN T, HIGH SENSITIVITY RESULT: 50 NG/L — SIGNIFICANT CHANGE UP
UROBILINOGEN FLD QL: 0.2 MG/DL — SIGNIFICANT CHANGE UP (ref 0.2–1)
UUN UR-MCNC: 224 MG/DL — SIGNIFICANT CHANGE UP
WBC # BLD: 9.36 K/UL — SIGNIFICANT CHANGE UP (ref 3.8–10.5)
WBC # FLD AUTO: 9.36 K/UL — SIGNIFICANT CHANGE UP (ref 3.8–10.5)
WBC UR QL: 1 /HPF — SIGNIFICANT CHANGE UP (ref 0–5)

## 2024-02-03 PROCEDURE — 99223 1ST HOSP IP/OBS HIGH 75: CPT

## 2024-02-03 PROCEDURE — 71045 X-RAY EXAM CHEST 1 VIEW: CPT | Mod: 26

## 2024-02-03 PROCEDURE — 99285 EMERGENCY DEPT VISIT HI MDM: CPT

## 2024-02-03 PROCEDURE — 99497 ADVNCD CARE PLAN 30 MIN: CPT

## 2024-02-03 PROCEDURE — 99418 PROLNG IP/OBS E/M EA 15 MIN: CPT

## 2024-02-03 RX ORDER — ONDANSETRON 8 MG/1
4 TABLET, FILM COATED ORAL ONCE
Refills: 0 | Status: COMPLETED | OUTPATIENT
Start: 2024-02-03 | End: 2024-02-03

## 2024-02-03 RX ORDER — HEPARIN SODIUM 5000 [USP'U]/ML
5000 INJECTION INTRAVENOUS; SUBCUTANEOUS EVERY 8 HOURS
Refills: 0 | Status: DISCONTINUED | OUTPATIENT
Start: 2024-02-03 | End: 2024-02-06

## 2024-02-03 RX ORDER — INSULIN LISPRO 100/ML
VIAL (ML) SUBCUTANEOUS
Refills: 0 | Status: DISCONTINUED | OUTPATIENT
Start: 2024-02-03 | End: 2024-02-06

## 2024-02-03 RX ORDER — DEXTROSE 50 % IN WATER 50 %
25 SYRINGE (ML) INTRAVENOUS ONCE
Refills: 0 | Status: DISCONTINUED | OUTPATIENT
Start: 2024-02-03 | End: 2024-02-06

## 2024-02-03 RX ORDER — SODIUM CHLORIDE 9 MG/ML
1000 INJECTION, SOLUTION INTRAVENOUS
Refills: 0 | Status: DISCONTINUED | OUTPATIENT
Start: 2024-02-03 | End: 2024-02-04

## 2024-02-03 RX ORDER — FAMOTIDINE 10 MG/ML
20 INJECTION INTRAVENOUS ONCE
Refills: 0 | Status: COMPLETED | OUTPATIENT
Start: 2024-02-03 | End: 2024-02-03

## 2024-02-03 RX ORDER — DEXTROSE 50 % IN WATER 50 %
12.5 SYRINGE (ML) INTRAVENOUS ONCE
Refills: 0 | Status: DISCONTINUED | OUTPATIENT
Start: 2024-02-03 | End: 2024-02-06

## 2024-02-03 RX ORDER — DIPHENHYDRAMINE HCL 50 MG
25 CAPSULE ORAL ONCE
Refills: 0 | Status: COMPLETED | OUTPATIENT
Start: 2024-02-03 | End: 2024-02-03

## 2024-02-03 RX ORDER — DEXTROSE 50 % IN WATER 50 %
15 SYRINGE (ML) INTRAVENOUS ONCE
Refills: 0 | Status: DISCONTINUED | OUTPATIENT
Start: 2024-02-03 | End: 2024-02-06

## 2024-02-03 RX ORDER — GLUCAGON INJECTION, SOLUTION 0.5 MG/.1ML
1 INJECTION, SOLUTION SUBCUTANEOUS ONCE
Refills: 0 | Status: DISCONTINUED | OUTPATIENT
Start: 2024-02-03 | End: 2024-02-04

## 2024-02-03 RX ORDER — LANOLIN ALCOHOL/MO/W.PET/CERES
3 CREAM (GRAM) TOPICAL AT BEDTIME
Refills: 0 | Status: DISCONTINUED | OUTPATIENT
Start: 2024-02-03 | End: 2024-02-04

## 2024-02-03 RX ORDER — ACETAMINOPHEN 500 MG
1000 TABLET ORAL ONCE
Refills: 0 | Status: COMPLETED | OUTPATIENT
Start: 2024-02-03 | End: 2024-02-03

## 2024-02-03 RX ORDER — MAGNESIUM SULFATE 500 MG/ML
1 VIAL (ML) INJECTION ONCE
Refills: 0 | Status: COMPLETED | OUTPATIENT
Start: 2024-02-03 | End: 2024-02-03

## 2024-02-03 RX ORDER — INSULIN GLARGINE 100 [IU]/ML
5 INJECTION, SOLUTION SUBCUTANEOUS ONCE
Refills: 0 | Status: COMPLETED | OUTPATIENT
Start: 2024-02-03 | End: 2024-02-03

## 2024-02-03 RX ORDER — ACETAMINOPHEN 500 MG
650 TABLET ORAL EVERY 6 HOURS
Refills: 0 | Status: DISCONTINUED | OUTPATIENT
Start: 2024-02-03 | End: 2024-02-05

## 2024-02-03 RX ORDER — SODIUM CHLORIDE 9 MG/ML
250 INJECTION INTRAMUSCULAR; INTRAVENOUS; SUBCUTANEOUS ONCE
Refills: 0 | Status: COMPLETED | OUTPATIENT
Start: 2024-02-03 | End: 2024-02-03

## 2024-02-03 RX ORDER — INSULIN LISPRO 100/ML
VIAL (ML) SUBCUTANEOUS AT BEDTIME
Refills: 0 | Status: DISCONTINUED | OUTPATIENT
Start: 2024-02-03 | End: 2024-02-06

## 2024-02-03 RX ORDER — INSULIN GLARGINE 100 [IU]/ML
5 INJECTION, SOLUTION SUBCUTANEOUS AT BEDTIME
Refills: 0 | Status: DISCONTINUED | OUTPATIENT
Start: 2024-02-04 | End: 2024-02-06

## 2024-02-03 RX ADMIN — Medication 100 GRAM(S): at 18:38

## 2024-02-03 RX ADMIN — Medication 400 MILLIGRAM(S): at 13:01

## 2024-02-03 RX ADMIN — ONDANSETRON 4 MILLIGRAM(S): 8 TABLET, FILM COATED ORAL at 13:01

## 2024-02-03 RX ADMIN — Medication 30 MILLILITER(S): at 13:01

## 2024-02-03 RX ADMIN — HEPARIN SODIUM 5000 UNIT(S): 5000 INJECTION INTRAVENOUS; SUBCUTANEOUS at 23:15

## 2024-02-03 RX ADMIN — INSULIN GLARGINE 5 UNIT(S): 100 INJECTION, SOLUTION SUBCUTANEOUS at 21:27

## 2024-02-03 RX ADMIN — FAMOTIDINE 20 MILLIGRAM(S): 10 INJECTION INTRAVENOUS at 13:00

## 2024-02-03 RX ADMIN — SODIUM CHLORIDE 250 MILLILITER(S): 9 INJECTION INTRAMUSCULAR; INTRAVENOUS; SUBCUTANEOUS at 13:02

## 2024-02-03 RX ADMIN — Medication 25 MILLIGRAM(S): at 13:01

## 2024-02-03 NOTE — ED ADULT NURSE NOTE - OBJECTIVE STATEMENT
Pt. presents to room 16 c/o facial edema &  burning in her chest since last night. Pt.'s face does appear "puffy". Pt. has PMHx HLD T2DM HF CAD. Pt. is hard of hearing and Wolof speaking daughter translating at bedside. Denies SOB abd pain n/v/d dysuria fever/chills. RAC20G labs sent medicated per order. pending results.

## 2024-02-03 NOTE — ED PROVIDER NOTE - PHYSICAL EXAMINATION
GENERAL: NAD, lying in bed comfortably  HEAD:  Atraumatic, Normocephalic  EYES: EOMI, conjunctiva and sclera clear  ENT: Moist mucous membranes  NECK: Supple  CHEST/LUNG: Unlabored respirations. Clear to auscultation bilaterally. No rales, rhonchi, wheezing, or rubs  HEART: Regular rate and rhythm. No murmurs, rubs, or gallops  ABDOMEN: Soft, nondistended, nontender  EXTREMITIES:  No cyanosis or edema. Brisk capillary refill  NERVOUS SYSTEM:  No focal deficits.   SKIN: No rashes or lesions

## 2024-02-03 NOTE — ED ADULT NURSE NOTE - NSFALLUNIVINTERV_ED_ALL_ED
Bed/Stretcher in lowest position, wheels locked, appropriate side rails in place/Call bell, personal items and telephone in reach/Instruct patient to call for assistance before getting out of bed/chair/stretcher/Non-slip footwear applied when patient is off stretcher/Moro to call system/Physically safe environment - no spills, clutter or unnecessary equipment/Purposeful proactive rounding/Room/bathroom lighting operational, light cord in reach

## 2024-02-03 NOTE — ED PROVIDER NOTE - OBJECTIVE STATEMENT
68-year-old female with past medical history of diabetes, hypertension, CAD with 3 stents, hyperlipidemia, presenting with poor p.o. intake, chest pressure sensation for 2 weeks.  Chest pain is pressure-like, nonradiating, midsternal, constant.  Patient reports that she was hospitalized approximately 2 weeks ago and discharged 1 week ago.  Per chart review, patient was hospitalized for MICHELLE and hyperkalemia.  Patient is also reporting that she has been having swelling on her face, under bilateral eyes with itchiness.  Also endorsing itchiness on abdomen.  Endorsing nausea without vomiting, and decreased p.o. intake.  Denies any shortness of breath, fever/chills, abdominal pain, diarrhea, dysuria, foamy urine, hematuria, lower extremity swelling or pain.  Patient denies any recent travel, any new medication, any new foods.    Interviewed with : Dionicio (ID [id number]); assisted by daughter Dinora.

## 2024-02-03 NOTE — ED ADULT TRIAGE NOTE - CHIEF COMPLAINT QUOTE
Pt was admitted to hospital 2 weeks ago, discharged 1 week for kidney problems.  Pt since noted with decreased PO intake and swelling to her face.  Pt holding chest in triage, unable to determine if painful.  Pt deaf, family speaking for pt.  Pt hx DM, HTN

## 2024-02-03 NOTE — ED PROVIDER NOTE - CADM POA PRESS ULCER
On 8/27/2020, at 1614, writer called patient at mobile to confirm Virtual Visit. Writer unable to make contact with patient. Writer left detailed voice message for callback. [phone], left as call back number. RADHA Watson, EMT     No

## 2024-02-03 NOTE — ED PROVIDER NOTE - ATTENDING CONTRIBUTION TO CARE
67 yo F with h/o DM, HTN, CAD who presents to the ED with multiple complaints. Pt was discharged from the hospital about 1.5 weeks ago and her daughter reports that since then she has been having trouble tolerating PO. Whenever she eats she gets a burning pain in her chest/abdomen as well as nausea. Also having intermittent vomiting. This morning family also noticed that the pt's face was swollen. Pt reports that her face is also pruritic. On exam, she has diffuse swelling of her face but no LE swelling or abdominal swelling noted. Plan for labs, CXR, most likely admission for failure to thrive/inability to tolerate PO

## 2024-02-03 NOTE — ED ADULT NURSE REASSESSMENT NOTE - NS ED NURSE REASSESS COMMENT FT1
Break Relief RN: Patient resting in stretcher A&Ox4, No complaints at this time. RR equal and unlabored, remains on cardiac monitor, sinus tachycardic. No acute distress at this time. Medicated as ordered. Care plan continued. Comfort measures provided. Safety maintained. Awaiting bed assignment.

## 2024-02-03 NOTE — ED PROVIDER NOTE - NSFOLLOWUPINSTRUCTIONS_ED_ALL_ED_FT
You were seen in the emergency department for facial swelling and chest discomfort.      1) Follow-up with your primary care provider in 1-5 days. Follow up with cardiology within 1 week.    2) Continue to take all medications as prescribed.    3) Rest and stay hydrated. Pain can be managed with Acetaminophen (aka Tylenol)  over the counter as directed.    4) Return to the ER for any new or worsening symptoms. Some symptoms to look out for include worsening chest pain, shortness of breath, breaking out into sweats, passing out, vomiting, or any other worsening or concerning symptoms.       Please read all attached patient information.

## 2024-02-04 DIAGNOSIS — E11.9 TYPE 2 DIABETES MELLITUS WITHOUT COMPLICATIONS: ICD-10-CM

## 2024-02-04 DIAGNOSIS — R22.0 LOCALIZED SWELLING, MASS AND LUMP, HEAD: ICD-10-CM

## 2024-02-04 DIAGNOSIS — E83.42 HYPOMAGNESEMIA: ICD-10-CM

## 2024-02-04 DIAGNOSIS — Z29.9 ENCOUNTER FOR PROPHYLACTIC MEASURES, UNSPECIFIED: ICD-10-CM

## 2024-02-04 DIAGNOSIS — E78.5 HYPERLIPIDEMIA, UNSPECIFIED: ICD-10-CM

## 2024-02-04 DIAGNOSIS — Z95.5 PRESENCE OF CORONARY ANGIOPLASTY IMPLANT AND GRAFT: Chronic | ICD-10-CM

## 2024-02-04 DIAGNOSIS — R79.89 OTHER SPECIFIED ABNORMAL FINDINGS OF BLOOD CHEMISTRY: ICD-10-CM

## 2024-02-04 DIAGNOSIS — D64.9 ANEMIA, UNSPECIFIED: ICD-10-CM

## 2024-02-04 DIAGNOSIS — N17.9 ACUTE KIDNEY FAILURE, UNSPECIFIED: ICD-10-CM

## 2024-02-04 DIAGNOSIS — Z71.89 OTHER SPECIFIED COUNSELING: ICD-10-CM

## 2024-02-04 DIAGNOSIS — I25.10 ATHEROSCLEROTIC HEART DISEASE OF NATIVE CORONARY ARTERY WITHOUT ANGINA PECTORIS: ICD-10-CM

## 2024-02-04 DIAGNOSIS — I21.4 NON-ST ELEVATION (NSTEMI) MYOCARDIAL INFARCTION: ICD-10-CM

## 2024-02-04 LAB
A1C WITH ESTIMATED AVERAGE GLUCOSE RESULT: 7.8 % — HIGH (ref 4–5.6)
ALBUMIN SERPL ELPH-MCNC: 4.1 G/DL — SIGNIFICANT CHANGE UP (ref 3.3–5)
ALP SERPL-CCNC: 104 U/L — SIGNIFICANT CHANGE UP (ref 40–120)
ALT FLD-CCNC: 5 U/L — SIGNIFICANT CHANGE UP (ref 4–33)
ANION GAP SERPL CALC-SCNC: 14 MMOL/L — SIGNIFICANT CHANGE UP (ref 7–14)
APPEARANCE UR: CLEAR — SIGNIFICANT CHANGE UP
AST SERPL-CCNC: 16 U/L — SIGNIFICANT CHANGE UP (ref 4–32)
BACTERIA # UR AUTO: ABNORMAL /HPF
BASOPHILS # BLD AUTO: 0.07 K/UL — SIGNIFICANT CHANGE UP (ref 0–0.2)
BASOPHILS NFR BLD AUTO: 0.8 % — SIGNIFICANT CHANGE UP (ref 0–2)
BILIRUB SERPL-MCNC: 0.3 MG/DL — SIGNIFICANT CHANGE UP (ref 0.2–1.2)
BILIRUB UR-MCNC: NEGATIVE — SIGNIFICANT CHANGE UP
BUN SERPL-MCNC: 16 MG/DL — SIGNIFICANT CHANGE UP (ref 7–23)
CALCIUM SERPL-MCNC: 9.3 MG/DL — SIGNIFICANT CHANGE UP (ref 8.4–10.5)
CAST: 1 /LPF — SIGNIFICANT CHANGE UP (ref 0–4)
CHLORIDE SERPL-SCNC: 101 MMOL/L — SIGNIFICANT CHANGE UP (ref 98–107)
CHOLEST SERPL-MCNC: 138 MG/DL — SIGNIFICANT CHANGE UP
CO2 SERPL-SCNC: 22 MMOL/L — SIGNIFICANT CHANGE UP (ref 22–31)
COLOR SPEC: YELLOW — SIGNIFICANT CHANGE UP
CREAT SERPL-MCNC: 1.24 MG/DL — SIGNIFICANT CHANGE UP (ref 0.5–1.3)
CULTURE RESULTS: SIGNIFICANT CHANGE UP
DIFF PNL FLD: NEGATIVE — SIGNIFICANT CHANGE UP
EGFR: 47 ML/MIN/1.73M2 — LOW
EOSINOPHIL # BLD AUTO: 0.57 K/UL — HIGH (ref 0–0.5)
EOSINOPHIL NFR BLD AUTO: 6.5 % — HIGH (ref 0–6)
ESTIMATED AVERAGE GLUCOSE: 177 — SIGNIFICANT CHANGE UP
FERRITIN SERPL-MCNC: 93 NG/ML — SIGNIFICANT CHANGE UP (ref 13–330)
GLUCOSE BLDC GLUCOMTR-MCNC: 146 MG/DL — HIGH (ref 70–99)
GLUCOSE BLDC GLUCOMTR-MCNC: 149 MG/DL — HIGH (ref 70–99)
GLUCOSE BLDC GLUCOMTR-MCNC: 172 MG/DL — HIGH (ref 70–99)
GLUCOSE BLDC GLUCOMTR-MCNC: 193 MG/DL — HIGH (ref 70–99)
GLUCOSE SERPL-MCNC: 115 MG/DL — HIGH (ref 70–99)
GLUCOSE UR QL: NEGATIVE MG/DL — SIGNIFICANT CHANGE UP
HCT VFR BLD CALC: 31 % — LOW (ref 34.5–45)
HDLC SERPL-MCNC: 41 MG/DL — LOW
HGB BLD-MCNC: 10.2 G/DL — LOW (ref 11.5–15.5)
IANC: 5.64 K/UL — SIGNIFICANT CHANGE UP (ref 1.8–7.4)
IMM GRANULOCYTES NFR BLD AUTO: 0.3 % — SIGNIFICANT CHANGE UP (ref 0–0.9)
IRON SATN MFR SERPL: 12 % — LOW (ref 14–50)
IRON SATN MFR SERPL: 37 UG/DL — SIGNIFICANT CHANGE UP (ref 30–160)
KETONES UR-MCNC: NEGATIVE MG/DL — SIGNIFICANT CHANGE UP
LEUKOCYTE ESTERASE UR-ACNC: ABNORMAL
LIPID PNL WITH DIRECT LDL SERPL: 61 MG/DL — SIGNIFICANT CHANGE UP
LYMPHOCYTES # BLD AUTO: 1.77 K/UL — SIGNIFICANT CHANGE UP (ref 1–3.3)
LYMPHOCYTES # BLD AUTO: 20.3 % — SIGNIFICANT CHANGE UP (ref 13–44)
MAGNESIUM SERPL-MCNC: 2.7 MG/DL — HIGH (ref 1.6–2.6)
MCHC RBC-ENTMCNC: 29.5 PG — SIGNIFICANT CHANGE UP (ref 27–34)
MCHC RBC-ENTMCNC: 32.9 GM/DL — SIGNIFICANT CHANGE UP (ref 32–36)
MCV RBC AUTO: 89.6 FL — SIGNIFICANT CHANGE UP (ref 80–100)
MONOCYTES # BLD AUTO: 0.65 K/UL — SIGNIFICANT CHANGE UP (ref 0–0.9)
MONOCYTES NFR BLD AUTO: 7.4 % — SIGNIFICANT CHANGE UP (ref 2–14)
NEUTROPHILS # BLD AUTO: 5.64 K/UL — SIGNIFICANT CHANGE UP (ref 1.8–7.4)
NEUTROPHILS NFR BLD AUTO: 64.7 % — SIGNIFICANT CHANGE UP (ref 43–77)
NITRITE UR-MCNC: NEGATIVE — SIGNIFICANT CHANGE UP
NON HDL CHOLESTEROL: 97 MG/DL — SIGNIFICANT CHANGE UP
NRBC # BLD: 0 /100 WBCS — SIGNIFICANT CHANGE UP (ref 0–0)
NRBC # FLD: 0 K/UL — SIGNIFICANT CHANGE UP (ref 0–0)
PH UR: 6 — SIGNIFICANT CHANGE UP (ref 5–8)
PHOSPHATE SERPL-MCNC: 3.5 MG/DL — SIGNIFICANT CHANGE UP (ref 2.5–4.5)
PLATELET # BLD AUTO: 349 K/UL — SIGNIFICANT CHANGE UP (ref 150–400)
POTASSIUM SERPL-MCNC: 4.6 MMOL/L — SIGNIFICANT CHANGE UP (ref 3.5–5.3)
POTASSIUM SERPL-SCNC: 4.6 MMOL/L — SIGNIFICANT CHANGE UP (ref 3.5–5.3)
PROT SERPL-MCNC: 7.3 G/DL — SIGNIFICANT CHANGE UP (ref 6–8.3)
PROT SERPL-MCNC: 7.6 G/DL — SIGNIFICANT CHANGE UP (ref 6–8.3)
PROT UR-MCNC: 30 MG/DL
RBC # BLD: 3.46 M/UL — LOW (ref 3.8–5.2)
RBC # FLD: 13.5 % — SIGNIFICANT CHANGE UP (ref 10.3–14.5)
RBC CASTS # UR COMP ASSIST: 0 /HPF — SIGNIFICANT CHANGE UP (ref 0–4)
SODIUM SERPL-SCNC: 137 MMOL/L — SIGNIFICANT CHANGE UP (ref 135–145)
SODIUM UR-SCNC: 119 MMOL/L — SIGNIFICANT CHANGE UP
SP GR SPEC: 1.01 — SIGNIFICANT CHANGE UP (ref 1–1.03)
SPECIMEN SOURCE: SIGNIFICANT CHANGE UP
SQUAMOUS # UR AUTO: 6 /HPF — HIGH (ref 0–5)
TIBC SERPL-MCNC: 305 UG/DL — SIGNIFICANT CHANGE UP (ref 220–430)
TRANSFERRIN SERPL-MCNC: 250 MG/DL — SIGNIFICANT CHANGE UP (ref 200–360)
TRIGL SERPL-MCNC: 179 MG/DL — HIGH
TSH SERPL-MCNC: 3.08 UIU/ML — SIGNIFICANT CHANGE UP (ref 0.27–4.2)
UIBC SERPL-MCNC: 268 UG/DL — SIGNIFICANT CHANGE UP (ref 110–370)
UROBILINOGEN FLD QL: 0.2 MG/DL — SIGNIFICANT CHANGE UP (ref 0.2–1)
WBC # BLD: 8.73 K/UL — SIGNIFICANT CHANGE UP (ref 3.8–10.5)
WBC # FLD AUTO: 8.73 K/UL — SIGNIFICANT CHANGE UP (ref 3.8–10.5)
WBC UR QL: 6 /HPF — HIGH (ref 0–5)

## 2024-02-04 PROCEDURE — 86334 IMMUNOFIX E-PHORESIS SERUM: CPT | Mod: 26

## 2024-02-04 PROCEDURE — 86335 IMMUNFIX E-PHORSIS/URINE/CSF: CPT | Mod: 26

## 2024-02-04 PROCEDURE — 84166 PROTEIN E-PHORESIS/URINE/CSF: CPT | Mod: 26

## 2024-02-04 PROCEDURE — 99232 SBSQ HOSP IP/OBS MODERATE 35: CPT

## 2024-02-04 PROCEDURE — 84165 PROTEIN E-PHORESIS SERUM: CPT | Mod: 26

## 2024-02-04 PROCEDURE — 76770 US EXAM ABDO BACK WALL COMP: CPT | Mod: 26

## 2024-02-04 RX ORDER — TAMSULOSIN HYDROCHLORIDE 0.4 MG/1
0.4 CAPSULE ORAL AT BEDTIME
Refills: 0 | Status: DISCONTINUED | OUTPATIENT
Start: 2024-02-04 | End: 2024-02-04

## 2024-02-04 RX ORDER — ATORVASTATIN CALCIUM 80 MG/1
40 TABLET, FILM COATED ORAL AT BEDTIME
Refills: 0 | Status: DISCONTINUED | OUTPATIENT
Start: 2024-02-04 | End: 2024-02-06

## 2024-02-04 RX ORDER — MAGNESIUM SULFATE 500 MG/ML
2 VIAL (ML) INJECTION ONCE
Refills: 0 | Status: COMPLETED | OUTPATIENT
Start: 2024-02-04 | End: 2024-02-04

## 2024-02-04 RX ORDER — ASPIRIN/CALCIUM CARB/MAGNESIUM 324 MG
81 TABLET ORAL DAILY
Refills: 0 | Status: DISCONTINUED | OUTPATIENT
Start: 2024-02-04 | End: 2024-02-06

## 2024-02-04 RX ADMIN — Medication 1: at 10:01

## 2024-02-04 RX ADMIN — Medication 1: at 14:16

## 2024-02-04 RX ADMIN — HEPARIN SODIUM 5000 UNIT(S): 5000 INJECTION INTRAVENOUS; SUBCUTANEOUS at 06:12

## 2024-02-04 RX ADMIN — HEPARIN SODIUM 5000 UNIT(S): 5000 INJECTION INTRAVENOUS; SUBCUTANEOUS at 21:56

## 2024-02-04 RX ADMIN — HEPARIN SODIUM 5000 UNIT(S): 5000 INJECTION INTRAVENOUS; SUBCUTANEOUS at 13:57

## 2024-02-04 RX ADMIN — INSULIN GLARGINE 5 UNIT(S): 100 INJECTION, SOLUTION SUBCUTANEOUS at 21:55

## 2024-02-04 RX ADMIN — Medication 81 MILLIGRAM(S): at 13:57

## 2024-02-04 RX ADMIN — ATORVASTATIN CALCIUM 40 MILLIGRAM(S): 80 TABLET, FILM COATED ORAL at 21:56

## 2024-02-04 RX ADMIN — Medication 25 GRAM(S): at 06:12

## 2024-02-04 NOTE — H&P ADULT - PROBLEM SELECTOR PLAN 2
-pro-BNP 6218, no evidence of volume overload currently and no complaining of orthopnea, shortness of breath at presentation   -continuous pulse ox ordered   -TTE in the AM  -cardiac monitoring   -cardiology consult per primary team in the AM  -EKG with evidence of TWI in AVL and I, similar to prior.

## 2024-02-04 NOTE — H&P ADULT - NSHPREVIEWOFSYSTEMS_GEN_ALL_CORE
Review of Systems:   CONSTITUTIONAL: No fever, weight loss  EYES: No eye pain, visual disturbances, or discharge  ENMT:  No difficulty hearing, tinnitus, vertigo; No sinus or throat pain  RESPIRATORY: No SOB. No cough, wheezing, chills or hemoptysis  CARDIOVASCULAR: No chest pain, palpitations, dizziness, or leg swelling  GASTROINTESTINAL: No abdominal or epigastric pain. No nausea, vomiting, or hematemesis; No diarrhea or constipation. No melena or hematochezia.  GENITOURINARY: No dysuria, frequency, hematuria, or incontinence  NEUROLOGICAL: No headaches, memory loss, loss of strength, numbness, or tremors  SKIN: No itching, burning, rashes, or lesions   LYMPH NODES: No enlarged glands  ENDOCRINE: No heat or cold intolerance; No hair loss  MUSCULOSKELETAL: No joint pain or swelling; No muscle, back pain  PSYCHIATRIC: No depression, anxiety, mood swings, or difficulty sleeping  HEME/LYMPH: No easy bruising, or bleeding gums Review of Systems:   CONSTITUTIONAL: no fever  EYES: No eye pain, visual disturbances, or discharge  ENMT:  + difficulty hearing. No nasal congestion  RESPIRATORY: +SOB with exertion. No cough, wheezing, chills.   CARDIOVASCULAR: no reported chest pain to me, but reportedly yes to ED, no palpitations, no leg swelling  GASTROINTESTINAL: No abdominal or epigastric pain. + nausea. no vomiting; No diarrhea or constipation. No melena or hematochezia.  GENITOURINARY: No dysuria, frequency, had some difficulty with urination yesterday  NEUROLOGICAL: No headaches, chronic numbness of lower extremities, no tremors  SKIN: +itching- face and body  ENDOCRINE: No heat or cold intolerance; No hair loss  MUSCULOSKELETAL: No joint pain or swelling; No muscle, back pain  PSYCHIATRIC: No depression, anxiety, mood swings

## 2024-02-04 NOTE — PROGRESS NOTE ADULT - PROBLEM SELECTOR PLAN 5
Cr last admit on dc was 1.06 (2.2 on arrival) now 1.35 on arrival  - mild MICHELLE vs variation  - downtreding to 1.24  - renal US neg for hydro  - monitor bladder scan  - dc flomax as may be related to allergy

## 2024-02-04 NOTE — PHARMACOTHERAPY INTERVENTION NOTE - COMMENTS
Medication list updated in Outpatient Medication Record (OMR) per discharge medication list from 1/25/24. Please call spectra [id number] if you have any questions.

## 2024-02-04 NOTE — H&P ADULT - PROBLEM SELECTOR PLAN 6
-Cr -BUN/Cr 16/1.35  -previously 16/1.06  -previously MICHELLE attributed to post-renal causes  -Renal US showing bilateral hyperdense lesions complex cysts, vascular/calcific and her non obstructing renal calculi, no hydronephorsis  -continue bladder scan Q6hr   -flomax 0.4 mg daily given -home med   -renal US ordered to r/o obstruction   -UA, urine Na ordered   -continue to monitor BMP daily   -strict intake and output every 4 hours  -renal consult per primary team above

## 2024-02-04 NOTE — H&P ADULT - HISTORY OF PRESENT ILLNESS
Ms. Rice is a 68 year old F with history of DM2, asthma, HTN, CAD s/p 3 stents (last 11 months ago, not on DAPT currently), HLD who presents to the ED with facial swelling bilaterally, decrease urine output and whole body itching. She reports since being discharged she has been eating whatever she can tolerate which is rice water. She reports the facial swelling started yesterday with associated body itching and face itching and decrease urine output yesterday. She reports taking 2 medications from [employer] to help her with this. She also reports decrease po intake and nausea since she has been discharged. Denies night sweats, LE edema, orthopnea. She reports she has some shortness of breath with exertion going up the stair, but that has not worsened since she was discharged. She reports a history of allergies to food-shrimp and eggplant. Denies abdominal pain, diarrhea, fever, and chills. She denies taking any new medications except for the medication she used yesterday to urinate and denies any cream/lotions. She reports having itching for the past 2 nights. Denies any vision problems. She also reports 4 weeks ago she was in [facility] when she was found to have fluid in her lungs. Labs were sig for the following: H/H 10.4/31.8, , trop 50->45, Mg 1.4, BUN/Cr 16/1.35, Na 134, EGFR 42. pro-BNP 6218. UA was positive for the followin protein. CXR of the lung with clear lungs. EKG as interpreted by me shows the following: , LAD, NSR, QTc 455 ms, TWI in AVL and I, similar to prior.         She reported to the ED that she has had 2 weeks of non-radiating midsternal constant chest pain that started 2 weeks ago and she was discharged one week ago. She denies any chest pressure or shortness of breath when I asked her.  Ms. Rice is a 68 year old F with history of DM2, asthma, HTN, CAD s/p 3 stents (last 11 months ago, not on DAPT currently), HLD who presents to the ED with facial swelling bilaterally, decrease urine output and whole body itching. She reports since being discharged she has been eating whatever she can tolerate which is rice water. She reports the facial swelling started yesterday with associated body itching and face itching and decrease urine output yesterday. She reports taking 2 medications from [employer] to help her with this. She also reports decrease po intake and nausea since she has been discharged. Denies night sweats, LE edema, orthopnea. She reports she has some shortness of breath with exertion going up the stair, but that has not worsened since she was discharged. She reports a history of allergies to food-shrimp and eggplant. Denies abdominal pain, diarrhea, fever, and chills. She denies taking any new medications except for the medication she used yesterday to urinate and denies any cream/lotions. She reports having itching for the past 2 nights. Denies any vision problems. She also reports 4 weeks ago she was in [facility] when she was found to have fluid in her lungs. Labs were sig for the following: H/H 10.4/31.8, , trop 50->45, Mg 1.4, BUN/Cr 16/1.35, Na 134, EGFR 42. pro-BNP 6218. UA was positive for the followin protein. CXR of the lung with clear lungs. EKG as interpreted by me shows the following: , LAD, NSR, QTc 455 ms, TWI in AVL and I, similar to prior. She was recently admitted to the hospital (2024) for MICHELLE with workup suggestive of post-renal etiology. Renal US showed bilateral hyperdense lesions with complex cysts, vascular/calcification for non-obstructing renal calculi, no hydronephrosis and she was started on flomax on discharge.       She reported to the ED that she has had 2 weeks of non-radiating midsternal constant chest pain that started 2 weeks ago and she was discharged one week ago. She denies any chest pressure or shortness of breath when I asked her.

## 2024-02-04 NOTE — H&P ADULT - NSHPLABSRESULTS_GEN_ALL_CORE
10.4   9.36  )-----------( 361      ( 2024 12:03 )             31.8     134<L>  |  95<L>  |  16  ----------------------------<  154<H>     02  4.8   |  18<L>  |  1.35<H>    Ca    9.0      2024 12:03  Phos  4.0       Mg     1.40         TPro  8.1  /  Alb  4.3  /  TBili  0.3  /  DBili  x   /  AST  23  /  ALT  7   /  AlkPhos  104                  hs Troponin, T - 45 ng/L (24 @ 15:00)  hs Troponin, T - 50 ng/L (24 @ 12:03)      Pro-BNP: 6218 (24 @ 12:03)          Urinalysis Basic - ( 2024 12:15 )  Color: Yellow / Appearance: Clear / S.013 / pH: 5.5  Gluc: Negative mg/dL / Ketone: Negative mg/dL  / Bili: Negative / Urobili: 0.2 mg/dL   Blood: Negative / Protein: 30 mg/dL / Nitrite: Negative   Leuk Esterase: Negative / RBC: 0 /HPF / WBC 1 /HPF   Sq Epi: x / Non Sq Epi: x / Bacteria: Negative /HPF

## 2024-02-04 NOTE — H&P ADULT - NSHPPHYSICALEXAM_GEN_ALL_CORE
Vital Signs Last 24 Hrs  T(C): 36.7 (03 Feb 2024 22:41), Max: 36.9 (03 Feb 2024 15:49)  T(F): 98 (03 Feb 2024 22:41), Max: 98.5 (03 Feb 2024 15:49)  HR: 115 (03 Feb 2024 22:41) (98 - 115)  BP: 129/72 (03 Feb 2024 22:41) (124/66 - 139/82)  BP(mean): --  RR: 18 (03 Feb 2024 22:41) (17 - 18)  SpO2: 96% (03 Feb 2024 22:41) (96% - 99%)    Parameters below as of 03 Feb 2024 22:41  Patient On (Oxygen Delivery Method): room air        CONSTITUTIONAL: Well-groomed, in no apparent distress  EYES: No conjunctival or scleral injection, non-icteric; PERRLA and symmetric, facial swelling bilaterally to maxillary regions and forehead with periorbital swelling.   ENMT: No external nasal lesions; nasal mucosa not inflamed;  oral mucosa with moist membranes  NECK: Trachea midline without palpable neck mass; thyroid not enlarged and non-tender  RESPIRATORY: Breathing comfortably;  lungs CTA without wheeze/rhonchi/rales  CARDIOVASCULAR: +S1S2, RRR, no M/G/R;  no lower extremity edema bilaterally  GASTROINTESTINAL: No palpable masses or tenderness, +BS throughout, no rebound/guarding; no hepatosplenomegaly; no hernia palpated  MUSCULOSKELETAL: no digital clubbing or cyanosis; normal strength and tone of extremities  SKIN: No rashes or ulcers noted; no subcutaneous nodules or induration palpable  NEUROLOGIC: CN II-XII intact; sensation intact in LEs b/l to light touch  PSYCHIATRIC: A+O x 3; mood and affect appropriate; appropriate insight and judgment

## 2024-02-04 NOTE — H&P ADULT - PROBLEM SELECTOR PLAN 10
DTV prophylaxis: heparin 5000 units every 8 hours  Diet: cardiac diabetic diet, halal, no shelfish  GI prophylaxis: none

## 2024-02-04 NOTE — H&P ADULT - PROBLEM SELECTOR PLAN 3
-trop 50->45 with no evidence of chest pain currently   -EKG with findings above   -TTE in AM  -continue aspirin 81 mg daily   -cardiac monitoring

## 2024-02-04 NOTE — H&P ADULT - NSICDXPASTMEDICALHX_GEN_ALL_CORE_FT
PAST MEDICAL HISTORY:  Asthma     CAD (coronary artery disease)     DM (diabetes mellitus)     HTN (hypertension)     Renal calculi

## 2024-02-04 NOTE — PROGRESS NOTE ADULT - PROBLEM SELECTOR PLAN 1
Appears consistent w/ allergy however unclear to what, ?possibly new med from recent admit however pt not even clear on meds; denies food allergies.  Not c/w CHF, nephrotic sx etc as no supporting exam findings for these.   - s/p benadryl, famotidine and zofran in the ED--no steroids or epi given  - abs eosin now high, LFTs wnl, no fevers--DRESS unlikely   - currently reports improvement in symptoms, no wheezing, no issues with swallowing  - not c/w angioedema  - f/u tryptase   - consider AI c/s however can likely be f/u as OP as no allergy testing would be done IP and sx improving   - will dc Flomax given possible new med with sulfa moiety vs statin vs metoformin vs something she took home but unable to report Appears consistent w/ allergy however unclear to what, ?possibly new med from recent admit however pt not even clear on meds; denies food allergies.  Not c/w CHF, nephrotic sx etc as no supporting exam findings for these.   - s/p benadryl, famotidine and zofran in the ED--no steroids or epi given  - abs eosin now high, LFTs wnl, no fevers--DRESS unlikely   - currently reports improvement in symptoms, no wheezing, no issues with swallowing  - not c/w angioedema  - f/u tryptase   - consider AI c/s however can likely be f/u as OP as no allergy testing would be done IP and sx improving   - will dc Flomax given possible new med with sulfa moiety vs statin vs metoformin vs something she took home but unable to report  - challenging to identify allergen in pt who does not know meds, last admit family did not know prior meds either

## 2024-02-04 NOTE — H&P ADULT - PROBLEM SELECTOR PLAN 11
Extensively discussed with daughter given patient not able to currently undergoing GOC after multiple attempts with  in different ways. When daughter was asked in front of the patient with a , patient would like to be full code, wants her daughter to be her proxy and would be ok for dialysis if needed in the future.

## 2024-02-04 NOTE — PROGRESS NOTE ADULT - PROBLEM SELECTOR PLAN 8
Most recent HbA1c 7.8 % 2/4/24, at home on metformin 500 mg BID at home   - c/w BG, GINA, DM diet   - unclear if metformin also new, need to clarify with family (new last admit vs ran out and taken it before)

## 2024-02-04 NOTE — H&P ADULT - PROBLEM SELECTOR PLAN 1
-ddx: allergic rx vs nephrotic/nephritic syndrome vs HF from ACS/HD   -benadryl PRN for itching   Given dose of benadryl, famotidine and zofran in the ED  -tryptase in the AM   -allergy and immunology consult to eval for food allergy and allergy workup, no family history of allergy but has food allergies.   -TTE in AM and cards consult as below.   -urine protein/Cr 0.6, UA with 30 protein, renal consult in the AM per primary team.   -no concerns of shortness of breath at rest or anaphylaxis currently

## 2024-02-04 NOTE — H&P ADULT - PROBLEM SELECTOR PROBLEM 7
Patient would like a call back from the office. He says he is waiting for call back today.  He was there this week and had a test, Hypomagnesemia

## 2024-02-05 LAB
ALBUMIN SERPL ELPH-MCNC: 3.8 G/DL — SIGNIFICANT CHANGE UP (ref 3.3–5)
ALP SERPL-CCNC: 90 U/L — SIGNIFICANT CHANGE UP (ref 40–120)
ALT FLD-CCNC: 6 U/L — SIGNIFICANT CHANGE UP (ref 4–33)
ANION GAP SERPL CALC-SCNC: 12 MMOL/L — SIGNIFICANT CHANGE UP (ref 7–14)
APPEARANCE UR: CLEAR — SIGNIFICANT CHANGE UP
AST SERPL-CCNC: 16 U/L — SIGNIFICANT CHANGE UP (ref 4–32)
B PERT DNA SPEC QL NAA+PROBE: SIGNIFICANT CHANGE UP
B PERT+PARAPERT DNA PNL SPEC NAA+PROBE: SIGNIFICANT CHANGE UP
BACTERIA # UR AUTO: ABNORMAL /HPF
BASOPHILS # BLD AUTO: 0.07 K/UL — SIGNIFICANT CHANGE UP (ref 0–0.2)
BASOPHILS NFR BLD AUTO: 0.7 % — SIGNIFICANT CHANGE UP (ref 0–2)
BILIRUB SERPL-MCNC: 0.4 MG/DL — SIGNIFICANT CHANGE UP (ref 0.2–1.2)
BILIRUB UR-MCNC: NEGATIVE — SIGNIFICANT CHANGE UP
BORDETELLA PARAPERTUSSIS (RAPRVP): SIGNIFICANT CHANGE UP
BUN SERPL-MCNC: 12 MG/DL — SIGNIFICANT CHANGE UP (ref 7–23)
C PNEUM DNA SPEC QL NAA+PROBE: SIGNIFICANT CHANGE UP
CALCIUM SERPL-MCNC: 9.1 MG/DL — SIGNIFICANT CHANGE UP (ref 8.4–10.5)
CAST: 0 /LPF — SIGNIFICANT CHANGE UP (ref 0–4)
CHLORIDE SERPL-SCNC: 102 MMOL/L — SIGNIFICANT CHANGE UP (ref 98–107)
CO2 SERPL-SCNC: 22 MMOL/L — SIGNIFICANT CHANGE UP (ref 22–31)
COLOR SPEC: YELLOW — SIGNIFICANT CHANGE UP
CREAT SERPL-MCNC: 1 MG/DL — SIGNIFICANT CHANGE UP (ref 0.5–1.3)
DIFF PNL FLD: NEGATIVE — SIGNIFICANT CHANGE UP
EGFR: 61 ML/MIN/1.73M2 — SIGNIFICANT CHANGE UP
EOSINOPHIL # BLD AUTO: 0.72 K/UL — HIGH (ref 0–0.5)
EOSINOPHIL NFR BLD AUTO: 7.6 % — HIGH (ref 0–6)
FLUAV SUBTYP SPEC NAA+PROBE: SIGNIFICANT CHANGE UP
FLUBV RNA SPEC QL NAA+PROBE: SIGNIFICANT CHANGE UP
GLUCOSE BLDC GLUCOMTR-MCNC: 113 MG/DL — HIGH (ref 70–99)
GLUCOSE BLDC GLUCOMTR-MCNC: 163 MG/DL — HIGH (ref 70–99)
GLUCOSE BLDC GLUCOMTR-MCNC: 167 MG/DL — HIGH (ref 70–99)
GLUCOSE BLDC GLUCOMTR-MCNC: 263 MG/DL — HIGH (ref 70–99)
GLUCOSE SERPL-MCNC: 111 MG/DL — HIGH (ref 70–99)
GLUCOSE UR QL: 250 MG/DL
HADV DNA SPEC QL NAA+PROBE: SIGNIFICANT CHANGE UP
HCOV 229E RNA SPEC QL NAA+PROBE: SIGNIFICANT CHANGE UP
HCOV HKU1 RNA SPEC QL NAA+PROBE: SIGNIFICANT CHANGE UP
HCOV NL63 RNA SPEC QL NAA+PROBE: SIGNIFICANT CHANGE UP
HCOV OC43 RNA SPEC QL NAA+PROBE: SIGNIFICANT CHANGE UP
HCT VFR BLD CALC: 29.6 % — LOW (ref 34.5–45)
HGB BLD-MCNC: 10 G/DL — LOW (ref 11.5–15.5)
HMPV RNA SPEC QL NAA+PROBE: SIGNIFICANT CHANGE UP
HPIV1 RNA SPEC QL NAA+PROBE: SIGNIFICANT CHANGE UP
HPIV2 RNA SPEC QL NAA+PROBE: SIGNIFICANT CHANGE UP
HPIV3 RNA SPEC QL NAA+PROBE: SIGNIFICANT CHANGE UP
HPIV4 RNA SPEC QL NAA+PROBE: SIGNIFICANT CHANGE UP
IANC: 6.14 K/UL — SIGNIFICANT CHANGE UP (ref 1.8–7.4)
IMM GRANULOCYTES NFR BLD AUTO: 0.4 % — SIGNIFICANT CHANGE UP (ref 0–0.9)
KETONES UR-MCNC: NEGATIVE MG/DL — SIGNIFICANT CHANGE UP
LEUKOCYTE ESTERASE UR-ACNC: ABNORMAL
LYMPHOCYTES # BLD AUTO: 1.81 K/UL — SIGNIFICANT CHANGE UP (ref 1–3.3)
LYMPHOCYTES # BLD AUTO: 19.2 % — SIGNIFICANT CHANGE UP (ref 13–44)
M PNEUMO DNA SPEC QL NAA+PROBE: SIGNIFICANT CHANGE UP
MAGNESIUM SERPL-MCNC: 2.3 MG/DL — SIGNIFICANT CHANGE UP (ref 1.6–2.6)
MCHC RBC-ENTMCNC: 29.9 PG — SIGNIFICANT CHANGE UP (ref 27–34)
MCHC RBC-ENTMCNC: 33.8 GM/DL — SIGNIFICANT CHANGE UP (ref 32–36)
MCV RBC AUTO: 88.4 FL — SIGNIFICANT CHANGE UP (ref 80–100)
MONOCYTES # BLD AUTO: 0.65 K/UL — SIGNIFICANT CHANGE UP (ref 0–0.9)
MONOCYTES NFR BLD AUTO: 6.9 % — SIGNIFICANT CHANGE UP (ref 2–14)
NEUTROPHILS # BLD AUTO: 6.14 K/UL — SIGNIFICANT CHANGE UP (ref 1.8–7.4)
NEUTROPHILS NFR BLD AUTO: 65.2 % — SIGNIFICANT CHANGE UP (ref 43–77)
NITRITE UR-MCNC: NEGATIVE — SIGNIFICANT CHANGE UP
NRBC # BLD: 0 /100 WBCS — SIGNIFICANT CHANGE UP (ref 0–0)
NRBC # FLD: 0 K/UL — SIGNIFICANT CHANGE UP (ref 0–0)
PH UR: 6.5 — SIGNIFICANT CHANGE UP (ref 5–8)
PHOSPHATE SERPL-MCNC: 3.4 MG/DL — SIGNIFICANT CHANGE UP (ref 2.5–4.5)
PLATELET # BLD AUTO: 344 K/UL — SIGNIFICANT CHANGE UP (ref 150–400)
POTASSIUM SERPL-MCNC: 4.5 MMOL/L — SIGNIFICANT CHANGE UP (ref 3.5–5.3)
POTASSIUM SERPL-SCNC: 4.5 MMOL/L — SIGNIFICANT CHANGE UP (ref 3.5–5.3)
PROT SERPL-MCNC: 7 G/DL — SIGNIFICANT CHANGE UP (ref 6–8.3)
PROT UR-MCNC: SIGNIFICANT CHANGE UP MG/DL
RAPID RVP RESULT: SIGNIFICANT CHANGE UP
RBC # BLD: 3.35 M/UL — LOW (ref 3.8–5.2)
RBC # FLD: 13.1 % — SIGNIFICANT CHANGE UP (ref 10.3–14.5)
RBC CASTS # UR COMP ASSIST: 0 /HPF — SIGNIFICANT CHANGE UP (ref 0–4)
RSV RNA SPEC QL NAA+PROBE: SIGNIFICANT CHANGE UP
RV+EV RNA SPEC QL NAA+PROBE: SIGNIFICANT CHANGE UP
SARS-COV-2 RNA SPEC QL NAA+PROBE: SIGNIFICANT CHANGE UP
SODIUM SERPL-SCNC: 136 MMOL/L — SIGNIFICANT CHANGE UP (ref 135–145)
SP GR SPEC: 1.01 — SIGNIFICANT CHANGE UP (ref 1–1.03)
SQUAMOUS # UR AUTO: 7 /HPF — HIGH (ref 0–5)
TRYPTASE SERPL-MCNC: 5.6 UG/L — SIGNIFICANT CHANGE UP
UROBILINOGEN FLD QL: 0.2 MG/DL — SIGNIFICANT CHANGE UP (ref 0.2–1)
WBC # BLD: 9.44 K/UL — SIGNIFICANT CHANGE UP (ref 3.8–10.5)
WBC # FLD AUTO: 9.44 K/UL — SIGNIFICANT CHANGE UP (ref 3.8–10.5)
WBC UR QL: 11 /HPF — HIGH (ref 0–5)

## 2024-02-05 PROCEDURE — 71046 X-RAY EXAM CHEST 2 VIEWS: CPT | Mod: 26

## 2024-02-05 PROCEDURE — 93306 TTE W/DOPPLER COMPLETE: CPT | Mod: 26

## 2024-02-05 PROCEDURE — 99232 SBSQ HOSP IP/OBS MODERATE 35: CPT

## 2024-02-05 RX ORDER — ACETAMINOPHEN 500 MG
650 TABLET ORAL EVERY 6 HOURS
Refills: 0 | Status: DISCONTINUED | OUTPATIENT
Start: 2024-02-05 | End: 2024-02-06

## 2024-02-05 RX ADMIN — HEPARIN SODIUM 5000 UNIT(S): 5000 INJECTION INTRAVENOUS; SUBCUTANEOUS at 23:05

## 2024-02-05 RX ADMIN — INSULIN GLARGINE 5 UNIT(S): 100 INJECTION, SOLUTION SUBCUTANEOUS at 23:05

## 2024-02-05 RX ADMIN — ATORVASTATIN CALCIUM 40 MILLIGRAM(S): 80 TABLET, FILM COATED ORAL at 23:05

## 2024-02-05 RX ADMIN — HEPARIN SODIUM 5000 UNIT(S): 5000 INJECTION INTRAVENOUS; SUBCUTANEOUS at 05:40

## 2024-02-05 RX ADMIN — Medication 650 MILLIGRAM(S): at 06:38

## 2024-02-05 RX ADMIN — Medication 3: at 12:44

## 2024-02-05 RX ADMIN — Medication 81 MILLIGRAM(S): at 11:11

## 2024-02-05 RX ADMIN — Medication 1: at 17:29

## 2024-02-05 RX ADMIN — HEPARIN SODIUM 5000 UNIT(S): 5000 INJECTION INTRAVENOUS; SUBCUTANEOUS at 13:51

## 2024-02-05 NOTE — PROGRESS NOTE ADULT - ASSESSMENT
68F Welsh-speaking, [place] ([employer] hearing [employer]), asthma, HTN, DM2, CAD s/p 3 stents (last 11 months ago, not on DAPT currently), HLD who presents to the ED with facial swelling bilaterally, decrease urine output and whole body itching.

## 2024-02-05 NOTE — PROVIDER CONTACT NOTE (OTHER) - SITUATION
Pt. in bed asleep.  Awaken by RN. Vital signs taken and patient noted to be febrile with a temp of 102

## 2024-02-05 NOTE — PROGRESS NOTE ADULT - PROBLEM SELECTOR PLAN 1
Appears consistent w/ allergy however unclear to what, ?possibly new med from recent admit however pt not even clear on meds; denies food allergies.  Not c/w CHF, nephrotic sx etc as no supporting exam findings for these.   - s/p benadryl, famotidine and zofran in the ED--no steroids or epi given  - abs eosin now high, LFTs wnl, no fevers--DRESS unlikely   -febrile to 102F yesterday, UA/CXR/RVP neg, f/u blood Cx, pt is nontoxic, monitor off abx   - currently reports improvement in symptoms, no wheezing, no issues with swallowing  - not c/w angioedema  - tryptase 5.6 (wnl)   - allergy consult as outpt as no allergy testing would be done IP and sx improving   - will dc Flomax given possible new med with sulfa moiety vs statin vs metoformin vs something she took home but unable to report  - challenging to identify allergen in pt who does not know meds, last admit family did not know prior meds either  - dispo: DC plan home if culture negative and afebrile x24h

## 2024-02-05 NOTE — PROGRESS NOTE ADULT - PROBLEM SELECTOR PLAN 5
Cr last admit on dc was 1.06 (2.2 on arrival) now 1.35 on arrival  - mild MICHELLE vs variation  - downtreding to 1  - renal US neg for hydro  - monitor bladder scan  - dc flomax as may be related to allergy Cr last admit on dc was 1.06 (2.2 on arrival) now 1.35 on arrival  - mild MICHELLE vs variation  - downtreding to 1  - renal US echogenic kidneys w/o hydro, Complex right renal cyst measuring approximately 2.5 cm  - will need outpt f/up PCP for repeat renal US in a year  - monitor bladder scan  - dc flomax as may be related to allergy

## 2024-02-06 VITALS
RESPIRATION RATE: 18 BRPM | HEART RATE: 95 BPM | SYSTOLIC BLOOD PRESSURE: 134 MMHG | DIASTOLIC BLOOD PRESSURE: 78 MMHG | OXYGEN SATURATION: 100 % | TEMPERATURE: 98 F

## 2024-02-06 LAB
% ALBUMIN: 47.4 % — SIGNIFICANT CHANGE UP
% ALPHA 1: 3.6 % — SIGNIFICANT CHANGE UP
% ALPHA 2: 16.9 % — SIGNIFICANT CHANGE UP
% BETA: 12.6 % — SIGNIFICANT CHANGE UP
% GAMMA, URINE: PRESENT
% GAMMA: 19.5 % — SIGNIFICANT CHANGE UP
ALBUMIN 24H MFR UR ELPH: PRESENT
ALBUMIN SERPL ELPH-MCNC: 3.46 G/DL — SIGNIFICANT CHANGE UP (ref 3.3–4.4)
ALBUMIN/GLOB SERPL ELPH: 0.9 RATIO — SIGNIFICANT CHANGE UP
ALPHA1 GLOB 24H MFR UR ELPH: PRESENT
ALPHA1 GLOB SERPL ELPH-MCNC: 0.26 G/DL — SIGNIFICANT CHANGE UP (ref 0.1–0.3)
ALPHA2 GLOB 24H MFR UR ELPH: PRESENT
ALPHA2 GLOB SERPL ELPH-MCNC: 1.2 G/DL — HIGH (ref 0.6–1)
ANION GAP SERPL CALC-SCNC: 15 MMOL/L — HIGH (ref 7–14)
B-GLOBULIN 24H MFR UR ELPH: PRESENT
B-GLOBULIN SERPL ELPH-MCNC: 0.92 G/DL — SIGNIFICANT CHANGE UP (ref 0.6–1.1)
BASOPHILS # BLD AUTO: 0.06 K/UL — SIGNIFICANT CHANGE UP (ref 0–0.2)
BASOPHILS NFR BLD AUTO: 0.7 % — SIGNIFICANT CHANGE UP (ref 0–2)
BUN SERPL-MCNC: 13 MG/DL — SIGNIFICANT CHANGE UP (ref 7–23)
CALCIUM SERPL-MCNC: 9.1 MG/DL — SIGNIFICANT CHANGE UP (ref 8.4–10.5)
CHLORIDE SERPL-SCNC: 99 MMOL/L — SIGNIFICANT CHANGE UP (ref 98–107)
CO2 SERPL-SCNC: 21 MMOL/L — LOW (ref 22–31)
CREAT SERPL-MCNC: 1.03 MG/DL — SIGNIFICANT CHANGE UP (ref 0.5–1.3)
EGFR: 58 ML/MIN/1.73M2 — LOW
EOSINOPHIL # BLD AUTO: 0.65 K/UL — HIGH (ref 0–0.5)
EOSINOPHIL NFR BLD AUTO: 7.1 % — HIGH (ref 0–6)
GAMMA GLOBULIN: 1.42 G/DL — SIGNIFICANT CHANGE UP (ref 0.7–1.7)
GLUCOSE BLDC GLUCOMTR-MCNC: 136 MG/DL — HIGH (ref 70–99)
GLUCOSE BLDC GLUCOMTR-MCNC: 277 MG/DL — HIGH (ref 70–99)
GLUCOSE SERPL-MCNC: 137 MG/DL — HIGH (ref 70–99)
HCT VFR BLD CALC: 29.8 % — LOW (ref 34.5–45)
HGB BLD-MCNC: 9.9 G/DL — LOW (ref 11.5–15.5)
IANC: 5.44 K/UL — SIGNIFICANT CHANGE UP (ref 1.8–7.4)
IMM GRANULOCYTES NFR BLD AUTO: 0.4 % — SIGNIFICANT CHANGE UP (ref 0–0.9)
LYMPHOCYTES # BLD AUTO: 2.33 K/UL — SIGNIFICANT CHANGE UP (ref 1–3.3)
LYMPHOCYTES # BLD AUTO: 25.4 % — SIGNIFICANT CHANGE UP (ref 13–44)
M PROTEIN 24H UR ELPH-MRATE: SIGNIFICANT CHANGE UP
MAGNESIUM SERPL-MCNC: 1.9 MG/DL — SIGNIFICANT CHANGE UP (ref 1.6–2.6)
MCHC RBC-ENTMCNC: 29.6 PG — SIGNIFICANT CHANGE UP (ref 27–34)
MCHC RBC-ENTMCNC: 33.2 GM/DL — SIGNIFICANT CHANGE UP (ref 32–36)
MCV RBC AUTO: 89.2 FL — SIGNIFICANT CHANGE UP (ref 80–100)
MONOCYTES # BLD AUTO: 0.65 K/UL — SIGNIFICANT CHANGE UP (ref 0–0.9)
MONOCYTES NFR BLD AUTO: 7.1 % — SIGNIFICANT CHANGE UP (ref 2–14)
NEUTROPHILS # BLD AUTO: 5.44 K/UL — SIGNIFICANT CHANGE UP (ref 1.8–7.4)
NEUTROPHILS NFR BLD AUTO: 59.3 % — SIGNIFICANT CHANGE UP (ref 43–77)
NRBC # BLD: 0 /100 WBCS — SIGNIFICANT CHANGE UP (ref 0–0)
NRBC # FLD: 0 K/UL — SIGNIFICANT CHANGE UP (ref 0–0)
PHOSPHATE SERPL-MCNC: 3.2 MG/DL — SIGNIFICANT CHANGE UP (ref 2.5–4.5)
PLATELET # BLD AUTO: 357 K/UL — SIGNIFICANT CHANGE UP (ref 150–400)
POTASSIUM SERPL-MCNC: 4.5 MMOL/L — SIGNIFICANT CHANGE UP (ref 3.5–5.3)
POTASSIUM SERPL-SCNC: 4.5 MMOL/L — SIGNIFICANT CHANGE UP (ref 3.5–5.3)
PROT ?TM UR-MCNC: <30 MG/DL — SIGNIFICANT CHANGE UP
PROT PATTERN 24H UR ELPH-IMP: SIGNIFICANT CHANGE UP
PROT PATTERN SERPL ELPH-IMP: SIGNIFICANT CHANGE UP
PROT SERPL-MCNC: 7.3 G/DL — SIGNIFICANT CHANGE UP
RBC # BLD: 3.34 M/UL — LOW (ref 3.8–5.2)
RBC # FLD: 13 % — SIGNIFICANT CHANGE UP (ref 10.3–14.5)
SODIUM SERPL-SCNC: 135 MMOL/L — SIGNIFICANT CHANGE UP (ref 135–145)
WBC # BLD: 9.17 K/UL — SIGNIFICANT CHANGE UP (ref 3.8–10.5)
WBC # FLD AUTO: 9.17 K/UL — SIGNIFICANT CHANGE UP (ref 3.8–10.5)

## 2024-02-06 PROCEDURE — 99239 HOSP IP/OBS DSCHRG MGMT >30: CPT

## 2024-02-06 RX ORDER — METOPROLOL TARTRATE 50 MG
1 TABLET ORAL
Qty: 30 | Refills: 0
Start: 2024-02-06 | End: 2024-03-06

## 2024-02-06 RX ORDER — LOSARTAN POTASSIUM 100 MG/1
1 TABLET, FILM COATED ORAL
Qty: 30 | Refills: 0
Start: 2024-02-06 | End: 2024-03-06

## 2024-02-06 RX ORDER — LOSARTAN POTASSIUM 100 MG/1
25 TABLET, FILM COATED ORAL DAILY
Refills: 0 | Status: DISCONTINUED | OUTPATIENT
Start: 2024-02-06 | End: 2024-02-06

## 2024-02-06 RX ORDER — METFORMIN HYDROCHLORIDE 850 MG/1
1 TABLET ORAL
Qty: 60 | Refills: 0
Start: 2024-02-06 | End: 2024-03-06

## 2024-02-06 RX ORDER — METOPROLOL TARTRATE 50 MG
25 TABLET ORAL DAILY
Refills: 0 | Status: DISCONTINUED | OUTPATIENT
Start: 2024-02-06 | End: 2024-02-06

## 2024-02-06 RX ADMIN — Medication 3: at 13:30

## 2024-02-06 RX ADMIN — Medication 81 MILLIGRAM(S): at 13:31

## 2024-02-06 RX ADMIN — HEPARIN SODIUM 5000 UNIT(S): 5000 INJECTION INTRAVENOUS; SUBCUTANEOUS at 06:30

## 2024-02-06 NOTE — PROGRESS NOTE ADULT - PROBLEM SELECTOR PLAN 3
Denies CP presently  - trops flat since last admit  - TTE today  - c/w asa 81 mg, got this am and facial rxn better thus do not suspect this to be culprit med  - c/w statin  - dc tele
Denies CP presently  - trops flat since last admit  - TTE severe LV dysfunction with EF 20-25% as above  - c/w asa 81 mg, got this am and facial rxn better thus do not suspect this to be culprit med  - c/w statin  - start losartan 25 mg and toprol 25 mg qd  - outpt f/u cardiology clinic
Denies CP presently  - trops flat since last admit  - check TTE  - c/w asa 81 mg, got this am and facial rxn better thus do not suspect this to be culprit med  - c/w statin  - dc tele

## 2024-02-06 NOTE — DISCHARGE NOTE PROVIDER - HOSPITAL COURSE
68F Kazakh-speaking, [place] ([employer] s[employer] hearing [employer]), asthma, HTN, DM2, CAD s/p 4 stents (last 11 months ago, not on DAPT currently), HLD who presents to the ED with facial swelling bilaterally, decrease urine output and whole body itching.        Problem/Plan - 1:  ·  Problem: Facial swelling.   ·  Plan: Appears consistent w/ allergy however unclear to what, ?possibly new med from recent admit however pt not even clear on meds; denies food allergies.  Not c/w CHF, nephrotic sx etc as no supporting exam findings for these.   - s/p benadryl, famotidine and zofran in the ED--no steroids or epi given  - abs eosin now high, LFTs wnl, no fevers--DRESS unlikely   -febrile to 102F yesterday, UA/CXR/RVP neg, f/u blood Cx, pt is nontoxic, monitor off abx   - currently reports improvement in symptoms, no wheezing, no issues with swallowing  - not c/w angioedema  - tryptase 5.6 (wnl)   - allergy consult as outpt as no allergy testing would be done IP and sx improving   - will dc Flomax given possible new med with sulfa moiety vs statin vs metoformin vs something she took home but unable to report  - blood culture 2/5 ngtd  - Dispo: medically cleared for discharge home today, d/w son at bedside on 2/6  outpt f/u [facility] cardiology clinic  Time spent on discharge 32 minutes coordinating discharge plan and discussing with patient and family.     Problem/Plan - 2:  ·  Problem: Elevated brain natriuretic peptide (BNP) level.   ·  Plan: no s/s of volume overload, low suspicion for CHF exacerbation although has RF for chronic systolic HF/ICM  - TTE 2/5  severely decreased LV function with EF 20-25%, RWMA  - pt denies chest pain/sob/dizziness  d/w family, [place] recently [place], hx of CAD with prior MI and 4 stents, last stent around April 2023  - will start toprol 25 mg qd and losartan 25 mg qd  - d/w cardiology fellow, can f/up with [facility] cardiology clinic as outpt.     Problem/Plan - 3:  ·  Problem: CAD (coronary artery disease).   ·  Plan: Denies CP presently  - trops flat since last admit  - TTE severe LV dysfunction with EF 20-25% as above  - c/w asa 81 mg, got this am and facial rxn better thus do not suspect this to be culprit med  - c/w statin  - start losartan 25 mg and toprol 25 mg qd  - outpt f/u cardiology clinic.     Problem/Plan - 4:  ·  Problem: Anemia.   ·  Plan: Mild, no overt bleeding, recent CTAP no abdominal pathology   - iron studies c/w IRWIN  - can start PO iron/vitamin C on dc  - OP age appropriate screenings.     Problem/Plan - 5:  ·  Problem: MICHELLE (acute kidney injury).   ·  Plan: Cr last admit on dc was 1.06 (2.2 on arrival) now 1.35 on arrival  -downtrended to 1  - renal US echogenic kidneys w/o hydro, Complex right renal cyst measuring approximately 2.5 cm  - will need outpt f/up PCP for repeat renal US in a year  - monitor bladder scan  - dc flomax as may be related to allergy.

## 2024-02-06 NOTE — DISCHARGE NOTE PROVIDER - CARE PROVIDERS DIRECT ADDRESSES
SEVERITY:- BORDERLINE ECG -

SINUS RHYTHM

BORDERLINE PROLONGED QT INTERVAL

:

Confirmed by: Jori Helton MD 15-May-2019 19:10:55
[email or web address]

## 2024-02-06 NOTE — DISCHARGE NOTE PROVIDER - NSDCMRMEDTOKEN_GEN_ALL_CORE_FT
aspirin 81 mg oral delayed release tablet: 1 tab(s) orally once a day  atorvastatin 40 mg oral tablet: 1 tab(s) orally once a day (at bedtime)  metFORMIN 500 mg oral tablet: 1 tab(s) orally 2 times a day  tamsulosin 0.4 mg oral capsule: 1 cap(s) orally once a day (at bedtime)   aspirin 81 mg oral delayed release tablet: 1 tab(s) orally once a day  atorvastatin 40 mg oral tablet: 1 tab(s) orally once a day (at bedtime)  losartan 25 mg oral tablet: 1 tab(s) orally once a day  metFORMIN 1000 mg oral tablet: 1 tab(s) orally 2 times a day  metoprolol succinate 25 mg oral tablet, extended release: 1 tab(s) orally once a day

## 2024-02-06 NOTE — PROGRESS NOTE ADULT - PROBLEM SELECTOR PLAN 2
no s/s of volume overload, low suspicion for CHF exacerbation although has RF for chronic systolic HF/ICM  - TTE pending
no s/s of volume overload, low suspicion for CHF exacerbation although has RF for chronic systolic HF/ICM  - TTE 2/5  severely decreased LV function with EF 20-25%, RWMA  - pt denies chest pain/sob/dizziness  d/w family, [place], hx of CAD with prior MI and 4 stents, last stent around April 2023  - will start toprol 25 mg qd and losartan 25 mg qd  - d/w cardiology fellow, can f/up with [facility] cardiology clinic as outpt
no s/s of volume overload, low suspicion for CHF exacerbation although has RF for chronic systolic HF/ICM  - TTE expedited

## 2024-02-06 NOTE — DISCHARGE NOTE NURSING/CASE MANAGEMENT/SOCIAL WORK - NSDCFUADDAPPT_GEN_ALL_CORE_FT
follow up cardiology at [facility] cardiology clinic  call [phone] for appointment

## 2024-02-06 NOTE — PROGRESS NOTE ADULT - ASSESSMENT
68F Ukrainian-speaking, [place] ([employer] hearing [employer]), asthma, HTN, DM2, CAD s/p 4 stents (last 11 months ago, not on DAPT currently), HLD who presents to the ED with facial swelling bilaterally, decrease urine output and whole body itching.

## 2024-02-06 NOTE — DISCHARGE NOTE PROVIDER - NSFOLLOWUPCLINICS_GEN_ALL_ED_FT
[facility] General Internal Medicine  General Internal Medicine  [address] [place] [address]  Phone: [phone]  Fax:     [facility] [place]  Internal Medicine  [address] [place] [address]  Phone: [phone]  Fax: [phone]

## 2024-02-06 NOTE — PROGRESS NOTE ADULT - PROBLEM SELECTOR PLAN 9
SQH  Full code per H&P  per last admit stated moved from [facility] 3m ago, however grand-daughter today stating in end of Dec, that admit had run out of meds such as insulin and came in with MICHELLE/hyperglycemia thus suspect current meds are only meds however cannot confirm   per med pharmacist: outpt meds: ASA 81, atorvastatin 40mg, flomax 0.4mg  no PT needs
SQH  Full code per H&P  per last admit stated moved from [place] 3m ago, however grand-daughter today stating in end of Dec, that admit had run out of meds such as insulin and came in with MICHELLE/hyperglycemia thus suspect current meds are only meds however cannot confirm   no PT needs
SQH  Full code per H&P  per last admit stated moved from [facility] 3m ago, however grand-daughter today stating in end of Dec, that admit had run out of meds such as insulin and came in with MICHELLE/hyperglycemia thus suspect current meds are only meds however cannot confirm   per med pharmacist: outpt meds: ASA 81, atorvastatin 40mg, flomax 0.4mg  no PT needs

## 2024-02-06 NOTE — PROGRESS NOTE ADULT - PROBLEM SELECTOR PLAN 8
Most recent HbA1c 7.8 % 2/4/24, at home on metformin 500 mg BID at home   - c/w BG, GINA, DM diet   - unclear if metformin also new, need to clarify with family (new last admit vs ran out and taken it before) Most recent HbA1c 7.8 % 2/4/24, at home on metformin 500 mg BID at home   - c/w BG, GINA, DM diet   - on Lantus 5U hs  discharge on metformin 1000mg bid

## 2024-02-06 NOTE — PROGRESS NOTE ADULT - PROBLEM SELECTOR PLAN 4
Mild, no overt bleeding, recent CTAP no abdominal pathology   - iron studies c/w IRWIN  - can start PO iron/vitamin C on dc  - OP age appropriate screenings

## 2024-02-06 NOTE — DISCHARGE NOTE NURSING/CASE MANAGEMENT/SOCIAL WORK - PATIENT PORTAL LINK FT
You can access the FollowMyHealth Patient Portal offered by [facility] by registering at the following website: [email or web address]. By joining [employer]’s FollowMyHealth portal, you will also be able to view your health information using other applications (apps) compatible with our system.

## 2024-02-06 NOTE — DISCHARGE NOTE PROVIDER - NSDCCPCAREPLAN_GEN_ALL_CORE_FT
PRINCIPAL DISCHARGE DIAGNOSIS  Diagnosis: Facial swelling  Assessment and Plan of Treatment: you probably had an allergic reaction , we stopped your flomax, continue other medications      SECONDARY DISCHARGE DIAGNOSES  Diagnosis: CAD (coronary artery disease)  Assessment and Plan of Treatment: your heart pumping function is poor on echocardiogram. continue to take cardiac medications, including, aspirin, atorvastatin, metoprolol and losartan, please follow up with cardiology clinic at [facility] , call for appointment

## 2024-02-06 NOTE — PROGRESS NOTE ADULT - PROBLEM SELECTOR PLAN 5
Cr last admit on dc was 1.06 (2.2 on arrival) now 1.35 on arrival  - mild MICHELLE vs variation  - downtreding to 1  - renal US echogenic kidneys w/o hydro, Complex right renal cyst measuring approximately 2.5 cm  - will need outpt f/up PCP for repeat renal US in a year  - monitor bladder scan  - dc flomax as may be related to allergy

## 2024-02-06 NOTE — DISCHARGE NOTE PROVIDER - CARE PROVIDER_API CALL
Dylan Vaca  Cardiovascular Disease  [id number] [place] [address], [address]  [place] [address]  Phone: [phone]  Fax: [phone]  Follow Up Time: 2 weeks

## 2024-02-06 NOTE — PROGRESS NOTE ADULT - PROBLEM SELECTOR PROBLEM 2
Elevated brain natriuretic peptide (BNP) level

## 2024-02-06 NOTE — PROGRESS NOTE ADULT - SUBJECTIVE AND OBJECTIVE BOX
Dr. Marilia King  Pager [phone]    PROGRESS NOTE:     Patient is a 68y old  Female who presents with a chief complaint of facial swelling, decrease urine output and body itching (2024 01:32)      SUBJECTIVE / OVERNIGHT EVENTS: d/w son at bedside who reports pt's itching and facial swelling is improving  ADDITIONAL REVIEW OF SYSTEMS: febrile to 102F yesterday, culture sent    MEDICATIONS  (STANDING):  aspirin enteric coated 81 milliGRAM(s) Oral daily  atorvastatin 40 milliGRAM(s) Oral at bedtime  dextrose 50% Injectable 25 Gram(s) IV Push once  dextrose 50% Injectable 25 Gram(s) IV Push once  dextrose 50% Injectable 12.5 Gram(s) IV Push once  heparin   Injectable 5000 Unit(s) SubCutaneous every 8 hours  insulin glargine Injectable (LANTUS) 5 Unit(s) SubCutaneous at bedtime  insulin lispro (ADMELOG) corrective regimen sliding scale   SubCutaneous three times a day before meals  insulin lispro (ADMELOG) corrective regimen sliding scale   SubCutaneous at bedtime    MEDICATIONS  (PRN):  acetaminophen     Tablet .. 650 milliGRAM(s) Oral every 6 hours PRN Temp greater or equal to 38C (100.4F), Mild Pain (1 - 3)  dextrose Oral Gel 15 Gram(s) Oral once PRN Blood Glucose LESS THAN 70 milliGRAM(s)/deciliter      CAPILLARY BLOOD GLUCOSE      POCT Blood Glucose.: 263 mg/dL (2024 12:12)  POCT Blood Glucose.: 113 mg/dL (2024 06:35)  POCT Blood Glucose.: 149 mg/dL (2024 22:33)  POCT Blood Glucose.: 146 mg/dL (2024 17:04)    I&O's Summary    2024 07:01  -  2024 07:00  --------------------------------------------------------  IN: 0 mL / OUT: 700 mL / NET: -700 mL        PHYSICAL EXAM:  Vital Signs Last 24 Hrs  T(C): 36.4 (2024 13:59), Max: 38.9 (2024 06:03)  T(F): 97.5 (2024 13:59), Max: 102 (2024 06:03)  HR: 90 (2024 13:59) (90 - 99)  BP: 128/70 (2024 13:59) (123/64 - 130/75)  BP(mean): 88 (2024 06:03) (88 - 88)  RR: 18 (2024 13:59) (16 - 18)  SpO2: 98% (2024 13:59) (97% - 99%)    Parameters below as of 2024 13:59  Patient On (Oxygen Delivery Method): room air      CONSTITUTIONAL: nad, well developed  Heent: mild facial puffiness, improved per pt  RESPIRATORY: Normal respiratory effort; lungs are clear to auscultation bilaterally  CARDIOVASCULAR: Regular rate and rhythm, normal S1 and S2, no murmur/rub/gallop; No lower extremity edema; Peripheral pulses are 2+ bilaterally  ABDOMEN: Nontender to palpation, normoactive bowel sounds, no rebound/guarding; No hepatosplenomegaly  MUSCULOSKELETAL: no clubbing or cyanosis of digits; no joint swelling or tenderness to palpation  PSYCH: A+O to person, place, and time; affect appropriate    LABS:                        10.0   9.44  )-----------( 344      ( 2024 06:35 )             29.6     02-05    136  |  102  |  12  ----------------------------<  111<H>  4.5   |  22  |  1.00    Ca    9.1      2024 06:35  Phos  3.4     02-05  Mg     2.30     02-05    TPro  7.0  /  Alb  3.8  /  TBili  0.4  /  DBili  x   /  AST  16  /  ALT  6   /  AlkPhos  90  02-05          Urinalysis Basic - ( 2024 14:26 )    Color: Yellow / Appearance: Clear / S.010 / pH: x  Gluc: x / Ketone: Negative mg/dL  / Bili: Negative / Urobili: 0.2 mg/dL   Blood: x / Protein: Trace mg/dL / Nitrite: Negative   Leuk Esterase: Small / RBC: 0 /HPF / WBC 11 /HPF   Sq Epi: x / Non Sq Epi: 7 /HPF / Bacteria: Occasional /HPF        Culture - Urine (collected 2024 17:18)  Source: Clean Catch Clean Catch (Midstream)  Final Report (2024 21:48):    <10,000 CFU/mL Normal Urogenital Magdalena        RADIOLOGY & ADDITIONAL TESTS:  Results Reviewed:   Imaging Personally Reviewed:  < from: US Kidney and Bladder (24 @ 12:35) >  IMPRESSION:  Echogenic kidneys without hydronephrosis    Complex right renal cyst measuring approximately 2.5 cm    < from: Xray Chest 1 View AP/PA (24 @ 12:45) >  IMPRESSION:  Clear lungs.        Electrocardiogram Personally Reviewed:    COORDINATION OF CARE:  Care Discussed with Consultants/Other Providers [Y/N]:  Prior or Outpatient Records Reviewed [Y/N]:  
Patient is a 68y old  Female who presents with a chief complaint of facial swelling, decrease urine output and body itching (2024 01:32)    SUBJECTIVE / OVERNIGHT EVENTS:    patient seen in her room on 6T, alert and sitting up in bed on arrival, grand-daughter visiting  [facility]  [id number]  patient denies SOB, throat closing, tongue swelling, drooling, did not eat our food bc she doesn't like it but family brought food, states facial swelling is better, has no known food allergies, is not sure of her meds, thinks she takes what we prescribed, no rashes, some itching of abdomen  denies other complaints     MEDICATIONS  (STANDING):  aspirin enteric coated 81 milliGRAM(s) Oral daily  atorvastatin 40 milliGRAM(s) Oral at bedtime  glucagon  Injectable 1 milliGRAM(s) IntraMuscular once  heparin   Injectable 5000 Unit(s) SubCutaneous every 8 hours  insulin glargine Injectable (LANTUS) 5 Unit(s) SubCutaneous at bedtime  insulin lispro (ADMELOG) corrective regimen sliding scale   SubCutaneous three times a day before meals  insulin lispro (ADMELOG) corrective regimen sliding scale   SubCutaneous at bedtime  tamsulosin 0.4 milliGRAM(s) Oral at bedtime    MEDICATIONS  (PRN):  acetaminophen     Tablet .. 650 milliGRAM(s) Oral every 6 hours PRN Mild Pain (1 - 3)  dextrose Oral Gel 15 Gram(s) Oral once PRN Blood Glucose LESS THAN 70 milliGRAM(s)/deciliter  melatonin 3 milliGRAM(s) Oral at bedtime PRN Insomnia    T(C): 36.7 (24 @ 13:59), Max: 36.9 (24 @ 21:57)  HR: 98 (24 @ 13:59) (97 - 115)  BP: 132/68 (24 @ 13:59) (129/72 - 139/82)  RR: 18 (24 @ 13:59) (17 - 18)  SpO2: 98% (24 @ 13:59) (96% - 100%)    CAPILLARY BLOOD GLUCOSE  POCT Blood Glucose.: 193 mg/dL (2024 14:09)  POCT Blood Glucose.: 172 mg/dL (2024 09:11)  POCT Blood Glucose.: 125 mg/dL (2024 21:23)    I&O's Summary    2024 07:01  -  2024 07:00  --------------------------------------------------------  IN: 150 mL / OUT: 0 mL / NET: 150 mL    2024 07:01  -  2024 16:44  --------------------------------------------------------  IN: 0 mL / OUT: 700 mL / NET: -700 mL    PHYSICAL EXAM:  GENERAL: NAD, appears younger than stated age   HEAD:  Atraumatic, Normocephalic, mild facial puffiness around cheeks and lower eyelids   EYES: conjunctiva and sclera clear, puffiness around lower lids   NECK: Supple, No JVD  CHEST/LUNG: Clear to auscultation bilaterally; No wheeze  HEART: Regular rate and rhythm; No murmurs, rubs, or gallops  ABDOMEN: Soft, Nontender, Nondistended; Bowel sounds present  EXTREMITIES:   warm and well perfused, No clubbing, cyanosis, or edema  PSYCH: AAOx3  NEUROLOGY: non-focal  SKIN: No rashes noted to abdomen, legs or back     LABS:                        10.2   8.73  )-----------( 349      ( 2024 06:30 )             31.0     02-04    137  |  101  |  16  ----------------------------<  115<H>  4.6   |  22  |  1.24    Ca    9.3      2024 06:30  Phos  3.5     02-04  Mg     2.70     -04    TPro  7.6  /  Alb  4.1  /  TBili  0.3  /  DBili  x   /  AST  16  /  ALT  5   /  AlkPhos  104  02-04    Urinalysis Basic - ( 2024 06:30 )  Color: Yellow / Appearance: Clear / S.015 / pH: x  Gluc: 115 mg/dL / Ketone: Negative mg/dL  / Bili: Negative / Urobili: 0.2 mg/dL   Blood: x / Protein: 30 mg/dL / Nitrite: Negative   Leuk Esterase: Trace / RBC: 0 /HPF / WBC 6 /HPF   Sq Epi: x / Non Sq Epi: 6 /HPF / Bacteria: Occasional /HPF    Care Discussed with Consultants/Other Providers:  
Dr. Marilia King  Pager [phone]    PROGRESS NOTE:     Patient is a 68y old  Female who presents with a chief complaint of facial swelling, decrease urine output and body itching (05 Feb 2024 16:56)      SUBJECTIVE / OVERNIGHT EVENTS: denies chest pain or sob   ADDITIONAL REVIEW OF SYSTEMS: afebrile     MEDICATIONS  (STANDING):  aspirin enteric coated 81 milliGRAM(s) Oral daily  atorvastatin 40 milliGRAM(s) Oral at bedtime  dextrose 50% Injectable 25 Gram(s) IV Push once  dextrose 50% Injectable 12.5 Gram(s) IV Push once  dextrose 50% Injectable 25 Gram(s) IV Push once  heparin   Injectable 5000 Unit(s) SubCutaneous every 8 hours  insulin glargine Injectable (LANTUS) 5 Unit(s) SubCutaneous at bedtime  insulin lispro (ADMELOG) corrective regimen sliding scale   SubCutaneous three times a day before meals  insulin lispro (ADMELOG) corrective regimen sliding scale   SubCutaneous at bedtime  losartan 25 milliGRAM(s) Oral daily  metoprolol succinate ER 25 milliGRAM(s) Oral daily    MEDICATIONS  (PRN):  acetaminophen     Tablet .. 650 milliGRAM(s) Oral every 6 hours PRN Temp greater or equal to 38C (100.4F), Mild Pain (1 - 3)  dextrose Oral Gel 15 Gram(s) Oral once PRN Blood Glucose LESS THAN 70 milliGRAM(s)/deciliter      CAPILLARY BLOOD GLUCOSE      POCT Blood Glucose.: 277 mg/dL (06 Feb 2024 13:11)  POCT Blood Glucose.: 136 mg/dL (06 Feb 2024 08:13)  POCT Blood Glucose.: 167 mg/dL (05 Feb 2024 22:37)  POCT Blood Glucose.: 163 mg/dL (05 Feb 2024 17:18)    I&O's Summary    06 Feb 2024 07:01  -  06 Feb 2024 15:59  --------------------------------------------------------  IN: 0 mL / OUT: 700 mL / NET: -700 mL        PHYSICAL EXAM:  Vital Signs Last 24 Hrs  T(C): 36.7 (06 Feb 2024 14:59), Max: 36.7 (05 Feb 2024 20:44)  T(F): 98.1 (06 Feb 2024 14:59), Max: 98.1 (05 Feb 2024 20:44)  HR: 95 (06 Feb 2024 14:59) (90 - 98)  BP: 134/78 (06 Feb 2024 14:59) (121/62 - 138/84)  BP(mean): --  RR: 18 (06 Feb 2024 14:59) (16 - 18)  SpO2: 100% (06 Feb 2024 14:59) (97% - 100%)    Parameters below as of 06 Feb 2024 14:59  Patient On (Oxygen Delivery Method): room air      CONSTITUTIONAL: nad, well developed  Heent: mild facial puffiness, improved per pt  RESPIRATORY: Normal respiratory effort; lungs are clear to auscultation bilaterally  CARDIOVASCULAR: Regular rate and rhythm, normal S1 and S2, no murmur/rub/gallop; No lower extremity edema; Peripheral pulses are 2+ bilaterally  ABDOMEN: Nontender to palpation, normoactive bowel sounds, no rebound/guarding; No hepatosplenomegaly  MUSCULOSKELETAL: no clubbing or cyanosis of digits; no joint swelling or tenderness to palpation  PSYCH: A+O to person, place, and time; affect appropriate    LABS:                        9.9    9.17  )-----------( 357      ( 06 Feb 2024 05:04 )             29.8     02-06    135  |  99  |  13  ----------------------------<  137<H>  4.5   |  21<L>  |  1.03    Ca    9.1      06 Feb 2024 05:04  Phos  3.2     02-06  Mg     1.90     02-06    TPro  7.0  /  Alb  3.8  /  TBili  0.4  /  DBili  x   /  AST  16  /  ALT  6   /  AlkPhos  90  02-05          Urinalysis Basic - ( 06 Feb 2024 05:04 )    Color: x / Appearance: x / SG: x / pH: x  Gluc: 137 mg/dL / Ketone: x  / Bili: x / Urobili: x   Blood: x / Protein: x / Nitrite: x   Leuk Esterase: x / RBC: x / WBC x   Sq Epi: x / Non Sq Epi: x / Bacteria: x        Culture - Blood (collected 05 Feb 2024 10:42)  Source: .Blood Blood-Venous  Preliminary Report (06 Feb 2024 15:02):    No growth at 24 hours    Culture - Blood (collected 05 Feb 2024 10:40)  Source: .Blood Blood-Peripheral  Preliminary Report (06 Feb 2024 15:02):    No growth at 24 hours    Culture - Urine (collected 03 Feb 2024 17:18)  Source: Clean Catch Clean Catch (Midstream)  Final Report (04 Feb 2024 21:48):    <10,000 CFU/mL Normal Urogenital Magdalena        RADIOLOGY & ADDITIONAL TESTS:  Results Reviewed:   Imaging Personally Reviewed:  < from: TTE W or WO Ultrasound Enhancing Agent (02.05.24 @ 16:25) >   1. The left ventricular cavity is normal. Left ventricular wall thickness is normal. Left ventricular systolic function is severely decreased with an ejection fraction visually estimated at 20 to 25%. There are regional wall motion abnormalities present. There is no evidence of a left ventricular thrombus. The apex appears particularly hypokinetic.   2. Normal right ventricular cavity size and normal systolic function.   3. Structurally normal mitral valve with normal leaflet excursion. There is calcification of the mitral valve annulus. There is mild to moderate mitral regurgitation.   4. No prior echocardiogram is available for comparis      Electrocardiogram Personally Reviewed:    COORDINATION OF CARE:  Care Discussed with Consultants/Other Providers [Y/N]:  Prior or Outpatient Records Reviewed [Y/N]:

## 2024-02-06 NOTE — PROGRESS NOTE ADULT - REASON FOR ADMISSION
facial swelling, decrease urine output and body itching
facial swelling, decrease urine output and body itching

## 2024-02-06 NOTE — DISCHARGE NOTE PROVIDER - NSDCFUADDAPPT_GEN_ALL_CORE_FT
follow up cardiology at [facility] cardiology clinic  call [phone] for appointment

## 2024-02-06 NOTE — PROGRESS NOTE ADULT - PROBLEM SELECTOR PROBLEM 3
Dr. Melara-PCP  Dr. Vasquez-Rheumatologist
CAD (coronary artery disease)

## 2024-02-06 NOTE — PROGRESS NOTE ADULT - PROBLEM SELECTOR PLAN 1
Appears consistent w/ allergy however unclear to what, ?possibly new med from recent admit however pt not even clear on meds; denies food allergies.  Not c/w CHF, nephrotic sx etc as no supporting exam findings for these.   - s/p benadryl, famotidine and zofran in the ED--no steroids or epi given  - abs eosin now high, LFTs wnl, no fevers--DRESS unlikely   -febrile to 102F yesterday, UA/CXR/RVP neg, f/u blood Cx, pt is nontoxic, monitor off abx   - currently reports improvement in symptoms, no wheezing, no issues with swallowing  - not c/w angioedema  - tryptase 5.6 (wnl)   - allergy consult as outpt as no allergy testing would be done IP and sx improving   - will dc Flomax given possible new med with sulfa moiety vs statin vs metoformin vs something she took home but unable to report  - blood culture 2/5 ngtd  - Dispo: medically cleared for discharge home today, d/w son at bedside on 2/6  outpt f/u [facility] cardiology clinic  Time spent on discharge 32 minutes coordinating discharge plan and discussing with patient and family.

## 2024-02-06 NOTE — PROGRESS NOTE ADULT - PROBLEM SELECTOR PLAN 7
LDL 61 2/4/24  - c/w atorvastatin

## 2024-02-08 LAB — INTERPRETATION 24H UR IFE-IMP: SIGNIFICANT CHANGE UP

## 2024-02-09 LAB — INTERPRETATION SERPL IFE-IMP: SIGNIFICANT CHANGE UP

## 2024-02-10 LAB
CULTURE RESULTS: SIGNIFICANT CHANGE UP
CULTURE RESULTS: SIGNIFICANT CHANGE UP
SPECIMEN SOURCE: SIGNIFICANT CHANGE UP
SPECIMEN SOURCE: SIGNIFICANT CHANGE UP

## 2024-11-03 NOTE — PROGRESS NOTE ADULT - PROBLEM SELECTOR PROBLEM 5
English
MICHELLE (acute kidney injury)